# Patient Record
Sex: FEMALE | Race: WHITE | HISPANIC OR LATINO | Employment: OTHER | URBAN - METROPOLITAN AREA
[De-identification: names, ages, dates, MRNs, and addresses within clinical notes are randomized per-mention and may not be internally consistent; named-entity substitution may affect disease eponyms.]

---

## 2019-10-02 ENCOUNTER — HOSPITAL ENCOUNTER (EMERGENCY)
Facility: MEDICAL CENTER | Age: 84
End: 2019-10-02
Attending: EMERGENCY MEDICINE
Payer: MEDICARE

## 2019-10-02 ENCOUNTER — APPOINTMENT (OUTPATIENT)
Dept: RADIOLOGY | Facility: MEDICAL CENTER | Age: 84
End: 2019-10-02
Attending: EMERGENCY MEDICINE
Payer: MEDICARE

## 2019-10-02 VITALS
OXYGEN SATURATION: 97 % | DIASTOLIC BLOOD PRESSURE: 68 MMHG | RESPIRATION RATE: 16 BRPM | HEART RATE: 96 BPM | HEIGHT: 59 IN | WEIGHT: 109.13 LBS | TEMPERATURE: 97.9 F | SYSTOLIC BLOOD PRESSURE: 122 MMHG | BODY MASS INDEX: 22 KG/M2

## 2019-10-02 DIAGNOSIS — N30.00 ACUTE CYSTITIS WITHOUT HEMATURIA: ICD-10-CM

## 2019-10-02 DIAGNOSIS — R10.9 ACUTE ABDOMINAL PAIN: ICD-10-CM

## 2019-10-02 DIAGNOSIS — R19.7 DIARRHEA, UNSPECIFIED TYPE: ICD-10-CM

## 2019-10-02 LAB
ALBUMIN SERPL BCP-MCNC: 4.2 G/DL (ref 3.2–4.9)
ALBUMIN/GLOB SERPL: 1.3 G/DL
ALP SERPL-CCNC: 91 U/L (ref 30–99)
ALT SERPL-CCNC: 12 U/L (ref 2–50)
ANION GAP SERPL CALC-SCNC: 8 MMOL/L (ref 0–11.9)
APPEARANCE UR: CLEAR
AST SERPL-CCNC: 19 U/L (ref 12–45)
BACTERIA #/AREA URNS HPF: ABNORMAL /HPF
BASOPHILS # BLD AUTO: 0.3 % (ref 0–1.8)
BASOPHILS # BLD: 0.03 K/UL (ref 0–0.12)
BILIRUB SERPL-MCNC: 0.6 MG/DL (ref 0.1–1.5)
BILIRUB UR QL STRIP.AUTO: NEGATIVE
BUN SERPL-MCNC: 15 MG/DL (ref 8–22)
CALCIUM SERPL-MCNC: 9.6 MG/DL (ref 8.5–10.5)
CHLORIDE SERPL-SCNC: 104 MMOL/L (ref 96–112)
CO2 SERPL-SCNC: 30 MMOL/L (ref 20–33)
COLOR UR: YELLOW
CREAT SERPL-MCNC: 0.8 MG/DL (ref 0.5–1.4)
EOSINOPHIL # BLD AUTO: 0.1 K/UL (ref 0–0.51)
EOSINOPHIL NFR BLD: 1.2 % (ref 0–6.9)
EPI CELLS #/AREA URNS HPF: NEGATIVE /HPF
ERYTHROCYTE [DISTWIDTH] IN BLOOD BY AUTOMATED COUNT: 45.4 FL (ref 35.9–50)
GLOBULIN SER CALC-MCNC: 3.2 G/DL (ref 1.9–3.5)
GLUCOSE SERPL-MCNC: 116 MG/DL (ref 65–99)
GLUCOSE UR STRIP.AUTO-MCNC: NEGATIVE MG/DL
HCT VFR BLD AUTO: 39.4 % (ref 37–47)
HGB BLD-MCNC: 12.6 G/DL (ref 12–16)
HYALINE CASTS #/AREA URNS LPF: ABNORMAL /LPF
IMM GRANULOCYTES # BLD AUTO: 0.01 K/UL (ref 0–0.11)
IMM GRANULOCYTES NFR BLD AUTO: 0.1 % (ref 0–0.9)
KETONES UR STRIP.AUTO-MCNC: NEGATIVE MG/DL
LACTATE BLD-SCNC: 0.9 MMOL/L (ref 0.5–2)
LEUKOCYTE ESTERASE UR QL STRIP.AUTO: ABNORMAL
LIPASE SERPL-CCNC: 37 U/L (ref 11–82)
LYMPHOCYTES # BLD AUTO: 1.6 K/UL (ref 1–4.8)
LYMPHOCYTES NFR BLD: 18.5 % (ref 22–41)
MCH RBC QN AUTO: 30.1 PG (ref 27–33)
MCHC RBC AUTO-ENTMCNC: 32 G/DL (ref 33.6–35)
MCV RBC AUTO: 94 FL (ref 81.4–97.8)
MICRO URNS: ABNORMAL
MONOCYTES # BLD AUTO: 0.37 K/UL (ref 0–0.85)
MONOCYTES NFR BLD AUTO: 4.3 % (ref 0–13.4)
NEUTROPHILS # BLD AUTO: 6.56 K/UL (ref 2–7.15)
NEUTROPHILS NFR BLD: 75.6 % (ref 44–72)
NITRITE UR QL STRIP.AUTO: NEGATIVE
NRBC # BLD AUTO: 0 K/UL
NRBC BLD-RTO: 0 /100 WBC
PH UR STRIP.AUTO: 8.5 [PH] (ref 5–8)
PLATELET # BLD AUTO: 328 K/UL (ref 164–446)
PMV BLD AUTO: 9.9 FL (ref 9–12.9)
POTASSIUM SERPL-SCNC: 3.7 MMOL/L (ref 3.6–5.5)
PROT SERPL-MCNC: 7.4 G/DL (ref 6–8.2)
PROT UR QL STRIP: NEGATIVE MG/DL
RBC # BLD AUTO: 4.19 M/UL (ref 4.2–5.4)
RBC # URNS HPF: ABNORMAL /HPF
RBC UR QL AUTO: NEGATIVE
SODIUM SERPL-SCNC: 142 MMOL/L (ref 135–145)
SP GR UR STRIP.AUTO: 1.02
TROPONIN T SERPL-MCNC: 10 NG/L (ref 6–19)
UROBILINOGEN UR STRIP.AUTO-MCNC: 0.2 MG/DL
WBC # BLD AUTO: 8.7 K/UL (ref 4.8–10.8)
WBC #/AREA URNS HPF: ABNORMAL /HPF

## 2019-10-02 PROCEDURE — 87086 URINE CULTURE/COLONY COUNT: CPT

## 2019-10-02 PROCEDURE — 81001 URINALYSIS AUTO W/SCOPE: CPT

## 2019-10-02 PROCEDURE — 85025 COMPLETE CBC W/AUTO DIFF WBC: CPT

## 2019-10-02 PROCEDURE — 80053 COMPREHEN METABOLIC PANEL: CPT

## 2019-10-02 PROCEDURE — 93005 ELECTROCARDIOGRAM TRACING: CPT | Performed by: EMERGENCY MEDICINE

## 2019-10-02 PROCEDURE — 700117 HCHG RX CONTRAST REV CODE 255: Performed by: EMERGENCY MEDICINE

## 2019-10-02 PROCEDURE — 83605 ASSAY OF LACTIC ACID: CPT

## 2019-10-02 PROCEDURE — 700102 HCHG RX REV CODE 250 W/ 637 OVERRIDE(OP): Performed by: EMERGENCY MEDICINE

## 2019-10-02 PROCEDURE — A9270 NON-COVERED ITEM OR SERVICE: HCPCS | Performed by: EMERGENCY MEDICINE

## 2019-10-02 PROCEDURE — 36415 COLL VENOUS BLD VENIPUNCTURE: CPT

## 2019-10-02 PROCEDURE — 83690 ASSAY OF LIPASE: CPT

## 2019-10-02 PROCEDURE — 74177 CT ABD & PELVIS W/CONTRAST: CPT

## 2019-10-02 PROCEDURE — 99284 EMERGENCY DEPT VISIT MOD MDM: CPT

## 2019-10-02 PROCEDURE — 84484 ASSAY OF TROPONIN QUANT: CPT

## 2019-10-02 RX ORDER — CEPHALEXIN 500 MG/1
500 CAPSULE ORAL 3 TIMES DAILY
Qty: 15 CAP | Refills: 0 | Status: SHIPPED | OUTPATIENT
Start: 2019-10-02 | End: 2019-10-07

## 2019-10-02 RX ORDER — CEPHALEXIN 500 MG/1
500 CAPSULE ORAL ONCE
Status: COMPLETED | OUTPATIENT
Start: 2019-10-02 | End: 2019-10-02

## 2019-10-02 RX ADMIN — IOHEXOL 80 ML: 350 INJECTION, SOLUTION INTRAVENOUS at 22:30

## 2019-10-02 RX ADMIN — CEPHALEXIN 500 MG: 500 CAPSULE ORAL at 23:11

## 2019-10-02 SDOH — HEALTH STABILITY: MENTAL HEALTH: HOW OFTEN DO YOU HAVE A DRINK CONTAINING ALCOHOL?: NEVER

## 2019-10-02 NOTE — ED TRIAGE NOTES
Chief Complaint   Patient presents with   • Abdominal Pain     x3 days     Ambulatory to triage for above. Explained triage process, to waiting room. Asked to inform RN if questions or concerns arise.

## 2019-10-03 NOTE — ED NOTES
RN attempt at IV access. Pt refused to have assigned RN perform procedure per Pt RN is too young and inexperienced and pt is requesting a more skillful RN. Second RN consulted for help with procedure.

## 2019-10-03 NOTE — ED NOTES
Pt discharged with one paper prescription to  at pharmacy. Pt verbalized understanding of discharge education and medication information. Pt ambulated to the lobby with steady gait accompanied by family member.

## 2019-10-03 NOTE — ED PROVIDER NOTES
"ED Provider Note    Scribed for Matt Alvarenga M.D. by Raquel Johnson. 10/2/2019  8:31 PM    Primary care provider: Patient states none  Means of arrival: Walk in  History obtained from: Patient   History limited by: None     CHIEF COMPLAINT  Chief Complaint   Patient presents with   • Abdominal Pain     x3 days       HPI  Digna Villar is a 84 y.o. female who presents to the Emergency Department for evaluation of worsening abdominal pain onset 3 days ago. Patient states she also has diarrhea. Patient suspects a bloody bowel movement but is unsure. Denies fever, chest pain, cough, vomiting or dysuria. She denies any accident or recent travel. Patient has had gallbladder removal surgery. Patient lives in [place] and has been visiting in [place] since last month.    REVIEW OF SYSTEMS  Pertinent positives include: abdominal pain, and diarrhea. Pertinent negatives include: fever, chest pain, cough, vomiting or dysuria. See history of present illness. All other systems are negative.     PAST MEDICAL HISTORY   has a past medical history of CAD (coronary artery disease) and Hypothyroid.    SURGICAL HISTORY  Gallbladder removal     SOCIAL HISTORY  Social History     Tobacco Use   • Smoking status: Never Smoker   Substance Use Topics   • Alcohol use: Never     Frequency: Never   • Drug use: Never      Social History     Substance and Sexual Activity   Drug Use Never       FAMILY HISTORY  History reviewed. No pertinent family history.    CURRENT MEDICATIONS  Home Medications     Reviewed by Nella Kirk R.N. (Registered Nurse) on 10/02/19 at 2027  Med List Status: Partial   Medication Last Dose Status   LEVOTHYROXINE SODIUM PO  Active                ALLERGIES  No Known Allergies    PHYSICAL EXAM  VITAL SIGNS: /79   Pulse 99   Temp 36.6 °C (97.9 °F) (Temporal)   Resp 16   Ht 1.499 m (4' 11\")   Wt 49.5 kg (109 lb 2 oz)   SpO2 95%   BMI 22.04 kg/m²     Constitutional: Alert in no apparent distress.  HENT: No " signs of trauma, Bilateral external ears normal, Nose normal. Uvula midline.   Eyes: Pupils are equal and reactive, Conjunctiva normal, Non-icteric.   Neck: Normal range of motion, No tenderness, Supple, No stridor.   Lymphatic: No lymphadenopathy noted.   Cardiovascular: Regular rate and rhythm, no murmurs.   Thorax & Lungs: Normal breath sounds, No respiratory distress, No wheezing, No chest tenderness.   Abdomen:  Left lower quadrant is soft and tender to palpation. Soft, No tenderness, No peritoneal signs, No masses, No pulsatile masses.   Skin: Warm, Dry, No erythema, No rash.   Back: No bony tenderness, No CVA tenderness.   Extremities: Intact distal pulses, No edema, No tenderness, No cyanosis.  Musculoskeletal: Good range of motion in all major joints. No tenderness to palpation or major deformities noted.   Neurologic: Alert , Normal motor function, Normal sensory function, No focal deficits noted.   Psychiatric: Affect normal, Judgment normal, Mood normal.     DIAGNOSTIC STUDIES / PROCEDURES    LABS  Labs Reviewed   CBC WITH DIFFERENTIAL - Abnormal; Notable for the following components:       Result Value    RBC 4.19 (*)     MCHC 32.0 (*)     Neutrophils-Polys 75.60 (*)     Lymphocytes 18.50 (*)     All other components within normal limits   COMP METABOLIC PANEL - Abnormal; Notable for the following components:    Glucose 116 (*)     All other components within normal limits   URINALYSIS,CULTURE IF INDICATED - Abnormal; Notable for the following components:    Ph 8.5 (*)     Leukocyte Esterase Moderate (*)     All other components within normal limits   URINE MICROSCOPIC (W/UA) - Abnormal; Notable for the following components:    WBC  (*)     Bacteria Few (*)     All other components within normal limits   LIPASE   ESTIMATED GFR   TROPONIN   LACTIC ACID   URINE CULTURE(NEW)      All labs reviewed by me.    EKG  12 Lead EKG interpreted by me to show:  Indication: Normal EKG  Normal sinus rhythm  Rate  81  Axis: Normal  Intervals: Normal  Normal T waves  Normal ST segments  My impression of this EKG: No STEMI.     RADIOLOGY  CT-ABDOMEN-PELVIS WITH   Final Result         1.  No acute abnormality.   2.  Diverticulosis   3.  Small fat-containing right inguinal hernia   4.  Atherosclerosis and atherosclerotic coronary artery disease.   5.  5 mm right middle lobe pulmonary nodule, see nodule follow-up recommendations below.      Low Risk: No routine follow-up      High Risk: Optional CT at 12 months      Comments: Nodules less than 6 mm do not require routine follow-up, but certain patients at high risk with suspicious nodule morphology, upper lobe location, or both may warrant 12-month follow-up.      Low Risk - Minimal or absent history of smoking and of other known risk factors.      High Risk - History of smoking or of other known risk factors.      Note: These recommendations do not apply to lung cancer screening, patients with immunosuppression, or patients with known primary cancer.      Fleischner Society 2017 Guidelines for Management of Incidentally Detected Pulmonary Nodules in Adults           The radiologist's interpretation of all radiological studies have been reviewed by me.    COURSE & MEDICAL DECISION MAKING  Nursing notes, VS, PMSFHx reviewed in chart.    84 y.o. female p/w chief complaint of worsening abdominal pain onset 1 week ago.    8:31 PM Patient seen and examined at bedside.  Patient will be treated with Omnipaque 350 mg/mL. Ordered CT-Abdomen-Pelvis, troponin, lactic acid, urinalysis, estimated GFR, CBC with diff.,CMP, Lipase, Urine Microscopic, and EKG. I discussed with patient the labs and orders needed to rule out diverticulitis. I also recommended a CT for chest to rule out any abnormalties.  Patient and patient's family was given the opportunity to ask questions at this time.     #abdominal pain     CT scan of abdomen ordered in order to rule out mass vs diverticulitis vs abscess  No  RLQ pain, TTP or fever to suggest appendicitis  No pain at of proportion to suggest mesenteric ischemia  No e/o rash or zoster  + UTI, will tx w/ keflex  No upper abd pain or elevation in lipase to suggest pancreatitis  ECG unremarkable and no chest pain to suggest intrathoracic etiology of abd pain    10:54 PM - Re-examined; The patient is resting in bed. I discussed her above findings and plans for discharge with a prescription for Keflex. She was instructed to return to the ED if her symptoms worsen. Patient understands and agrees.    The patient will return for new or worsening symptoms and is stable at the time of discharge.    The patient is referred to a primary physician for blood pressure management, diabetic screening, and for all other preventative health concerns.    DISPOSITION:  Patient will be discharged home in stable condition.    FOLLOW UP:  [facility], Emergency Dept  [address] [facility] [address] [phone]    If symptoms worsen    [address] [id number] [phone]    Please call to schedule close follow up appointment and repeat urine testing      OUTPATIENT MEDICATIONS:  Discharge Medication List as of 10/2/2019 10:55 PM      START taking these medications    Details   cephALEXin (KEFLEX) 500 MG Cap Take 1 Cap by mouth 3 times a day for 5 days., Disp-15 Cap, R-0, Print Rx Paper               FINAL IMPRESSION  1. Diarrhea, unspecified type    2. Acute abdominal pain    3. Acute cystitis without hematuria          Raquel IGL (Alfonso), am scribing for, and in the presence of, Matt Alvarenga M.D..    Electronically signed by: Raquel Johnson (Alfonso), 10/2/2019    IMatt M.D. personally performed the services described in this documentation, as scribed by Raquel Johnson in my presence, and it is both accurate and complete.    C    The note accurately reflects work and decisions made by me.  Matt  KAREN Alvarenga  10/3/2019  4:46 AM

## 2019-10-03 NOTE — ED NOTES
Pt presents to the ED with complaints of abdominal pain. Pt states that she has been having diarrhea for several days. Pt states that she has hx of IBS but denies any other significant medical hx.

## 2019-10-05 LAB
BACTERIA UR CULT: NORMAL
SIGNIFICANT IND 70042: NORMAL
SITE SITE: NORMAL
SOURCE SOURCE: NORMAL

## 2019-10-13 LAB — EKG IMPRESSION: NORMAL

## 2021-01-03 ENCOUNTER — HOSPITAL ENCOUNTER (OUTPATIENT)
Facility: MEDICAL CENTER | Age: 86
End: 2021-01-03
Attending: FAMILY MEDICINE
Payer: MEDICARE

## 2021-01-03 ENCOUNTER — OFFICE VISIT (OUTPATIENT)
Dept: URGENT CARE | Facility: CLINIC | Age: 86
End: 2021-01-03
Payer: MEDICARE

## 2021-01-03 ENCOUNTER — APPOINTMENT (OUTPATIENT)
Dept: RADIOLOGY | Facility: IMAGING CENTER | Age: 86
End: 2021-01-03
Attending: FAMILY MEDICINE
Payer: MEDICARE

## 2021-01-03 VITALS
DIASTOLIC BLOOD PRESSURE: 80 MMHG | OXYGEN SATURATION: 97 % | HEART RATE: 76 BPM | BODY MASS INDEX: 23.99 KG/M2 | TEMPERATURE: 97 F | SYSTOLIC BLOOD PRESSURE: 120 MMHG | WEIGHT: 111.2 LBS | RESPIRATION RATE: 14 BRPM | HEIGHT: 57 IN

## 2021-01-03 DIAGNOSIS — R11.0 NAUSEA: ICD-10-CM

## 2021-01-03 DIAGNOSIS — R06.02 SHORTNESS OF BREATH: ICD-10-CM

## 2021-01-03 DIAGNOSIS — M54.6 ACUTE RIGHT-SIDED THORACIC BACK PAIN: ICD-10-CM

## 2021-01-03 LAB
COVID ORDER STATUS COVID19: NORMAL
SARS-COV-2 RNA RESP QL NAA+PROBE: NOTDETECTED
SPECIMEN SOURCE: NORMAL

## 2021-01-03 PROCEDURE — U0005 INFEC AGEN DETEC AMPLI PROBE: HCPCS

## 2021-01-03 PROCEDURE — 99204 OFFICE O/P NEW MOD 45 MIN: CPT | Performed by: FAMILY MEDICINE

## 2021-01-03 PROCEDURE — U0003 INFECTIOUS AGENT DETECTION BY NUCLEIC ACID (DNA OR RNA); SEVERE ACUTE RESPIRATORY SYNDROME CORONAVIRUS 2 (SARS-COV-2) (CORONAVIRUS DISEASE [COVID-19]), AMPLIFIED PROBE TECHNIQUE, MAKING USE OF HIGH THROUGHPUT TECHNOLOGIES AS DESCRIBED BY CMS-2020-01-R: HCPCS

## 2021-01-03 PROCEDURE — 71046 X-RAY EXAM CHEST 2 VIEWS: CPT | Mod: TC | Performed by: FAMILY MEDICINE

## 2021-01-03 RX ORDER — ICOSAPENT ETHYL 1000 MG/1
2 CAPSULE ORAL
COMMUNITY
Start: 2020-10-23

## 2021-01-03 RX ORDER — ONDANSETRON 4 MG/1
4 TABLET, ORALLY DISINTEGRATING ORAL ONCE
Status: COMPLETED | OUTPATIENT
Start: 2021-01-03 | End: 2021-01-03

## 2021-01-03 RX ORDER — ONDANSETRON 4 MG/1
4 TABLET, ORALLY DISINTEGRATING ORAL EVERY 8 HOURS PRN
Qty: 6 TAB | Refills: 0 | Status: SHIPPED | OUTPATIENT
Start: 2021-01-03

## 2021-01-03 RX ORDER — RIVASTIGMINE 9.5 MG/24H
1 PATCH, EXTENDED RELEASE TRANSDERMAL DAILY
COMMUNITY
Start: 2020-12-31 | End: 2021-10-07

## 2021-01-03 RX ORDER — ASPIRIN 81 MG/1
81 TABLET ORAL DAILY
COMMUNITY

## 2021-01-03 RX ADMIN — ONDANSETRON 4 MG: 4 TABLET, ORALLY DISINTEGRATING ORAL at 15:01

## 2021-01-03 ASSESSMENT — ENCOUNTER SYMPTOMS
NAUSEA: 0
EYE REDNESS: 0
WEIGHT LOSS: 0
VOMITING: 0
EYE DISCHARGE: 0
SORE THROAT: 0
SENSORY CHANGE: 0
NERVOUS/ANXIOUS: 0
PALPITATIONS: 0
MYALGIAS: 0
SPUTUM PRODUCTION: 1
HEMOPTYSIS: 0
FLANK PAIN: 0
FOCAL WEAKNESS: 0

## 2021-01-03 ASSESSMENT — LIFESTYLE VARIABLES: SUBSTANCE_ABUSE: 0

## 2021-01-03 ASSESSMENT — FIBROSIS 4 INDEX: FIB4 SCORE: 1.44

## 2021-01-03 NOTE — PROGRESS NOTES
"Subjective:      Digna Villar is a 86 y.o. female who presents with Emesis (x 2 days with dizziness.  Pain behind R lung x 2 months. )            2 days N/V, 2 episodes. No blood in emesis. Possible food trigger from [employer] restaurant. Leg cramping. No fever. No diarrhea. Trying to push fluids. Urinated this morning. 2 months right upper back pain/intermittent with associated SOB. \"radiates to my lung\". Intermittent cough/worse in morning. No limb swelling. No other aggravating or alleviating factors.        Review of Systems   Constitutional: Negative for malaise/fatigue and weight loss.   HENT: Negative for ear pain and sore throat.    Eyes: Negative for discharge and redness.   Respiratory: Positive for sputum production (in the morning). Negative for hemoptysis.    Cardiovascular: Negative for palpitations and leg swelling.   Gastrointestinal: Negative for nausea and vomiting.   Genitourinary: Negative for dysuria, flank pain and hematuria.   Musculoskeletal: Negative for joint pain and myalgias.   Skin: Negative for itching and rash.   Neurological: Negative for sensory change and focal weakness.   Psychiatric/Behavioral: Negative for substance abuse. The patient is not nervous/anxious.      .  Medications, Allergies, and current problem list reviewed today in Epic  PMH:  has a past medical history of CAD (coronary artery disease) and Hypothyroid.   Also noted GI/possible IBD  MEDS:   Current Outpatient Medications:   •  rivastigmine (EXELON) 9.5 MG/24HR patch, Place 1 Patch on the skin every day., Disp: , Rfl:   •  Icosapent Ethyl 1 g Cap, Take 2 Caps by mouth., Disp: , Rfl:   •  ondansetron (ZOFRAN ODT) 4 MG TABLET DISPERSIBLE, Take 1 Tab by mouth every 8 hours as needed., Disp: 6 Tab, Rfl: 0  •  aspirin 81 MG EC tablet, Take 81 mg by mouth every day., Disp: , Rfl:   •  LEVOTHYROXINE SODIUM PO, Take  by mouth., Disp: , Rfl:     Current Facility-Administered Medications:   •  ondansetron (ZOFRAN ODT) " "dispertab 4 mg, 4 mg, Oral, Once, Vinay Adkins M.D.  ALLERGIES:   Allergies   Allergen Reactions   • Pcn [Penicillins] Rash     RAsh     SURGHX: History reviewed. No pertinent surgical history.  SOCHX:  reports that she has never smoked. She has never used smokeless tobacco. She reports that she does not drink alcohol or use drugs.  FH: Reviewed with patient, not pertinent to this visit.        Objective:     /80   Pulse 76   Temp 36.1 °C (97 °F) (Temporal)   Resp 14   Ht 1.448 m (4' 9\")   Wt 50.4 kg (111 lb 3.2 oz)   SpO2 97%   BMI 24.06 kg/m²      Physical Exam  Constitutional:       General: She is not in acute distress.     Appearance: She is well-developed.   HENT:      Head: Normocephalic and atraumatic.      Nose: Nose normal. No congestion.      Mouth/Throat:      Mouth: Mucous membranes are moist.   Eyes:      Conjunctiva/sclera: Conjunctivae normal.   Neck:      Musculoskeletal: Normal range of motion and neck supple.   Cardiovascular:      Rate and Rhythm: Normal rate and regular rhythm.      Heart sounds: Normal heart sounds. No murmur.   Pulmonary:      Effort: Pulmonary effort is normal.      Breath sounds: Normal breath sounds. No wheezing.   Abdominal:      General: Bowel sounds are normal.      Palpations: Abdomen is soft.      Tenderness: There is no abdominal tenderness.   Musculoskeletal:        Back:       Right lower leg: No edema.      Left lower leg: No edema.   Skin:     General: Skin is warm and dry.      Findings: No rash.   Neurological:      Mental Status: She is alert and oriented to person, place, and time.   Psychiatric:         Mood and Affect: Mood normal.                 Assessment/Plan:       CXR: no acute cardiopulmonary process per radiology    1. Nausea  ondansetron (ZOFRAN ODT) dispertab 4 mg    ondansetron (ZOFRAN ODT) 4 MG TABLET DISPERSIBLE    COVID/SARS COV-2 PCR    REFERRAL TO FOLLOW-UP WITH PRIMARY CARE   2. Acute right-sided thoracic back pain  REFERRAL TO " FOLLOW-UP WITH PRIMARY CARE   3. Shortness of breath  DX-CHEST-2 VIEWS    COVID/SARS COV-2 PCR    REFERRAL TO FOLLOW-UP WITH PRIMARY CARE       Differential diagnosis, natural history, supportive care, and indications for immediate follow-up discussed at length.     ORT with pedialyte    Self isolate per CDC protocol.  Follow-up COVID-19 testing.

## 2021-04-28 NOTE — DISCHARGE INSTRUCTIONS
Please discuss the following findings with your regular doctor:  CT-ABDOMEN-PELVIS WITH   Final Result         1.  No acute abnormality.   2.  Diverticulosis   3.  Small fat-containing right inguinal hernia   4.  Atherosclerosis and atherosclerotic coronary artery disease.   5.  5 mm right middle lobe pulmonary nodule, see nodule follow-up recommendations below.      Low Risk: No routine follow-up      High Risk: Optional CT at 12 months      Comments: Nodules less than 6 mm do not require routine follow-up, but certain patients at high risk with suspicious nodule morphology, upper lobe location, or both may warrant 12-month follow-up.      Low Risk - Minimal or absent history of smoking and of other known risk factors.      High Risk - History of smoking or of other known risk factors.      Note: These recommendations do not apply to lung cancer screening, patients with immunosuppression, or patients with known primary cancer.      Fleischner Society 2017 Guidelines for Management of Incidentally Detected Pulmonary Nodules in Adults            
Colonoscopy

## 2021-06-04 ENCOUNTER — OFFICE VISIT (OUTPATIENT)
Dept: URGENT CARE | Facility: CLINIC | Age: 86
End: 2021-06-04
Payer: MEDICARE

## 2021-06-04 ENCOUNTER — APPOINTMENT (OUTPATIENT)
Dept: RADIOLOGY | Facility: IMAGING CENTER | Age: 86
End: 2021-06-04
Attending: PHYSICIAN ASSISTANT
Payer: MEDICARE

## 2021-06-04 VITALS
HEART RATE: 80 BPM | SYSTOLIC BLOOD PRESSURE: 120 MMHG | DIASTOLIC BLOOD PRESSURE: 60 MMHG | OXYGEN SATURATION: 95 % | HEIGHT: 57 IN | BODY MASS INDEX: 25.03 KG/M2 | RESPIRATION RATE: 16 BRPM | TEMPERATURE: 97 F | WEIGHT: 116 LBS

## 2021-06-04 DIAGNOSIS — S93.401A SPRAIN OF RIGHT ANKLE, UNSPECIFIED LIGAMENT, INITIAL ENCOUNTER: ICD-10-CM

## 2021-06-04 PROCEDURE — 73610 X-RAY EXAM OF ANKLE: CPT | Mod: TC,RT | Performed by: PHYSICIAN ASSISTANT

## 2021-06-04 PROCEDURE — 99213 OFFICE O/P EST LOW 20 MIN: CPT | Performed by: PHYSICIAN ASSISTANT

## 2021-06-04 PROCEDURE — 73562 X-RAY EXAM OF KNEE 3: CPT | Mod: TC,RT | Performed by: PHYSICIAN ASSISTANT

## 2021-06-04 ASSESSMENT — ENCOUNTER SYMPTOMS
COUGH: 0
ABDOMINAL PAIN: 0
NAUSEA: 0
SPEECH CHANGE: 0
CHILLS: 0
DIARRHEA: 0
CLAUDICATION: 0
TINGLING: 0
WEAKNESS: 0
FEVER: 0
SHORTNESS OF BREATH: 0
TREMORS: 0
SENSORY CHANGE: 0
PALPITATIONS: 0
ORTHOPNEA: 0
DIZZINESS: 0
LOSS OF CONSCIOUSNESS: 0
SEIZURES: 0
HEADACHES: 0
VOMITING: 0
FOCAL WEAKNESS: 0
DOUBLE VISION: 0
BLURRED VISION: 0

## 2021-06-04 ASSESSMENT — FIBROSIS 4 INDEX: FIB4 SCORE: 1.44

## 2021-06-04 NOTE — PROGRESS NOTES
"Subjective:   Digna Villar is a 86 y.o. female who presents for Ankle Injury ((R) ankle may be broken, x1 day )      Ankle Injury   The incident occurred 12 to 24 hours ago. The incident occurred at home. The injury mechanism was an inversion injury. Pain location: right ankle. The pain is moderate. The pain has been constant since onset. Pertinent negatives include no tingling. She reports no foreign bodies present. Nothing aggravates the symptoms.       Review of Systems   Constitutional: Negative for chills and fever.   Eyes: Negative for blurred vision and double vision.   Respiratory: Negative for cough and shortness of breath.    Cardiovascular: Negative for chest pain, palpitations, orthopnea, claudication and leg swelling.   Gastrointestinal: Negative for abdominal pain, diarrhea, nausea and vomiting.   Musculoskeletal: Positive for joint pain.   Skin: Negative for rash.   Neurological: Negative for dizziness, tingling, tremors, sensory change, speech change, focal weakness, seizures, loss of consciousness, weakness and headaches.   All other systems reviewed and are negative.      Medications:    • aspirin  • Icosapent Ethyl Caps  • LEVOTHYROXINE SODIUM PO  • ondansetron Tbdp  • rivastigmine    Allergies: Pcn [penicillins]    Problem List: Digna Villar does not have a problem list on file.    Surgical History:  No past surgical history on file.    Past Social Hx: Digna Villar  reports that she has never smoked. She has never used smokeless tobacco. She reports that she does not drink alcohol and does not use drugs.     Past Family Hx:  Digna Villar family history is not on file.     Problem list, medications, and allergies reviewed by myself today in Epic.     Objective:     Blood Pressure 120/60 (BP Location: Left arm, Patient Position: Sitting, BP Cuff Size: Adult long)   Pulse 80   Temperature 36.1 °C (97 °F) (Temporal)   Respiration 16   Height 1.448 m (4' 9\")   Weight 52.6 kg (116 lb)  "  Oxygen Saturation 95%   Body Mass Index 25.10 kg/m²     Physical Exam  Vitals reviewed.   Constitutional:       Appearance: She is well-developed.   Cardiovascular:      Rate and Rhythm: Normal rate and regular rhythm.      Pulses: Normal pulses.           Dorsalis pedis pulses are 2+ on the right side and 2+ on the left side.        Posterior tibial pulses are 2+ on the right side and 2+ on the left side.      Heart sounds: Normal heart sounds.   Pulmonary:      Effort: Pulmonary effort is normal.      Breath sounds: Normal breath sounds.   Musculoskeletal:         General: Tenderness present.      Cervical back: Normal range of motion and neck supple.      Right lower leg: No edema.      Left lower leg: No edema.      Comments: PTP of the medial malleolus, right.  Pain at the knee as well.   Soft tissue swelling present with limited ROM compared to unaffected side.  No bony tenderness of the navicular, 5th metatarsal bones.      Special Tests:    Anterior Drawer: NEG  Bruce Test: NEG  Interdigital Neuroma Test: N/A   Skin:     General: Skin is warm and dry.      Capillary Refill: Capillary refill takes less than 2 seconds.      Findings: No erythema.      Comments: Skin intact over the affected area.   Neurological:      General: No focal deficit present.      Mental Status: She is alert and oriented to person, place, and time. Mental status is at baseline.      Coordination: Coordination normal.      Comments: Antalgic gait.   Psychiatric:         Mood and Affect: Mood normal.         Behavior: Behavior normal.         Thought Content: Thought content normal.         Judgment: Judgment normal.         RADIOLOGY RESULTS   DX-ANKLE 3+ VIEWS RIGHT    Result Date: 6/4/2021 6/4/2021 2:12 PM HISTORY/REASON FOR EXAM:  Pain/Deformity Following Trauma TECHNIQUE/EXAM DESCRIPTION AND NUMBER OF VIEWS:  3 views of the RIGHT ankle. COMPARISON: None FINDINGS: There is no evidence of fracture or dislocation.  The ankle  mortise is well-maintained. The talar dome is preserved.  No substantial soft tissue swelling is noted. There is spurring of the calcaneus at the insertion of the Achilles tendon.     1.  No evidence of fracture or dislocation. 2.  Calcaneal spurring.    DX-KNEE 3 VIEWS RIGHT    Result Date: 6/4/2021 6/4/2021 2:18 PM HISTORY/REASON FOR EXAM:  Pain/Deformity Following Trauma TECHNIQUE/EXAM DESCRIPTION AND NUMBER OF VIEWS:  3 views of the RIGHT knee. COMPARISON: None FINDINGS: There is no evidence of fracture or dislocation. There is mild tibiofemoral joint space narrowing, particularly medially. No joint effusion is seen. Bones are demineralized. Atherosclerotic calcification is seen.     1.  No evidence of acute fracture or dislocation. 2.  Demineralization. 3.  Mild Degenerative changes. 4.  Atherosclerotic plaque.            Assessment/Plan:     Medical Decision Making/Comments     -fall with inversion injury.  Pain of right ankle and knee.  -x rays normal.  No red flags on exam   Diagnosis and associated orders     1. Sprain of right ankle, unspecified ligament, initial encounter  DX-ANKLE 3+ VIEWS RIGHT    DX-KNEE 3 VIEWS RIGHT     -Protection/compression  -Rest: activity as tolerated  -Ice: Ice first 3-7 days.  20 min off/on  -Elevation  -NSAIDs/Acetomenophen as needed             Differential diagnosis, natural history, supportive care, and indications for immediate follow-up discussed.    Advised the patient to follow-up with the primary care physician for recheck, reevaluation, and consideration of further management.    Please note that this dictation was created using voice recognition software. I have made a reasonable attempt to correct obvious errors, but I expect that there are errors of grammar and possibly content that I did not discover before finalizing the note.

## 2021-07-08 ENCOUNTER — APPOINTMENT (OUTPATIENT)
Dept: RADIOLOGY | Facility: IMAGING CENTER | Age: 86
End: 2021-07-08
Attending: FAMILY MEDICINE
Payer: MEDICARE

## 2021-07-08 ENCOUNTER — OFFICE VISIT (OUTPATIENT)
Dept: URGENT CARE | Facility: CLINIC | Age: 86
End: 2021-07-08
Payer: MEDICARE

## 2021-07-08 VITALS
TEMPERATURE: 97 F | BODY MASS INDEX: 24.94 KG/M2 | WEIGHT: 118.8 LBS | HEART RATE: 68 BPM | OXYGEN SATURATION: 96 % | HEIGHT: 58 IN | DIASTOLIC BLOOD PRESSURE: 90 MMHG | SYSTOLIC BLOOD PRESSURE: 122 MMHG
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
## 2021-07-08 DIAGNOSIS — S46.912A STRAIN OF LEFT SHOULDER, INITIAL ENCOUNTER: ICD-10-CM

## 2021-07-08 PROCEDURE — 73060 X-RAY EXAM OF HUMERUS: CPT | Mod: TC,LT | Performed by: FAMILY MEDICINE

## 2021-07-08 PROCEDURE — 99214 OFFICE O/P EST MOD 30 MIN: CPT | Performed by: FAMILY MEDICINE

## 2021-07-08 ASSESSMENT — ENCOUNTER SYMPTOMS: FALLS: 1

## 2021-07-08 ASSESSMENT — FIBROSIS 4 INDEX: FIB4 SCORE: 1.44

## 2021-07-08 NOTE — PROGRESS NOTES
"Subjective:      Digna Villar is a 86 y.o. female who presents with Fall (left shoulder, [id number] ago hard to lift her arm)      - This is a pleasant and nontoxic appearing 86 y.o. female with c/o having Lt shoulder/arm pain s/p fall about 3 weeks ago. Initially when came in was having more lower limb pain, those pains have resolved but now still having Lt shoulder pain.        ALLERGIES:  Pcn [penicillins]     PMH:  Past Medical History:   Diagnosis Date   • CAD (coronary artery disease)    • Hypothyroid         PSH:  History reviewed. No pertinent surgical history.    MEDS:    Current Outpatient Medications:   •  aspirin 81 MG EC tablet, Take 81 mg by mouth every day., Disp: , Rfl:   •  rivastigmine (EXELON) 9.5 MG/24HR patch, Place 1 Patch on the skin every day., Disp: , Rfl:   •  Icosapent Ethyl 1 g Cap, Take 2 Caps by mouth., Disp: , Rfl:   •  ondansetron (ZOFRAN ODT) 4 MG TABLET DISPERSIBLE, Take 1 Tab by mouth every 8 hours as needed., Disp: 6 Tab, Rfl: 0  •  LEVOTHYROXINE SODIUM PO, Take  by mouth., Disp: , Rfl:     ** I have documented what I find to be significant in regards to past medical, social, family and surgical history  in my HPI or under PMH/PSH/FH review section, otherwise it is noncontributory **     HPI    Review of Systems   Musculoskeletal: Positive for falls and joint pain.   All other systems reviewed and are negative.     Objective:     /90 (BP Location: Right arm, Patient Position: Sitting, BP Cuff Size: Small adult)   Pulse 68   Temp 36.1 °C (97 °F) (Temporal)   Ht 1.473 m (4' 10\")   Wt 53.9 kg (118 lb 12.8 oz)   SpO2 96%   BMI 24.83 kg/m²      Physical Exam  Vitals and nursing note reviewed.   Constitutional:       General: She is not in acute distress.     Appearance: Normal appearance. She is well-developed.   HENT:      Head: Normocephalic and atraumatic.   Eyes:      General: No scleral icterus.  Cardiovascular:      Heart sounds: Normal heart sounds. No murmur heard. "     Pulmonary:      Effort: Pulmonary effort is normal. No respiratory distress.   Musculoskeletal:        Arms:       Comments: Lt arm: no discoloration. Felx/abd 100deg. + TTP. NVI   Skin:     Coloration: Skin is not jaundiced or pale.   Neurological:      Mental Status: She is alert.      Motor: No abnormal muscle tone.   Psychiatric:         Mood and Affect: Mood normal.         Behavior: Behavior normal.       Assessment/Plan:          1. Strain of left shoulder, initial encounter  DX-HUMERUS 2+ LEFT    REFERRAL TO SPORTS MEDICINE   2. Fall from ground level  DX-HUMERUS 2+ LEFT    REFERRAL TO SPORTS MEDICINE     Xray: no acute findings by MY READ. RADIOLOGY READ PENDING.     - Dx, plan & d/c instructions discussed w/ patient and/or family members  - Rest, stay hydrated OTC Motrin and/or Tylenol as needed  - E.R. precautions discussed     Asked to kindly follow up with their PCP's office in 2-3 days for a recheck, ER if not improving or feeling/getting worse.    Any realistic side effects of medications that may have been given today reviewed.     Patient left in stable condition      reviewed if narcotics given     Please note that this dictation was created using voice recognition software. I have made every reasonable attempt to correct obvious errors, but I expect that there are errors of grammar and possibly content that I did not discover before finalizing the note.

## 2021-08-17 ENCOUNTER — TELEPHONE (OUTPATIENT)
Dept: SCHEDULING | Facility: IMAGING CENTER | Age: 86
End: 2021-08-17

## 2021-08-19 ENCOUNTER — OFFICE VISIT (OUTPATIENT)
Dept: MEDICAL GROUP | Facility: CLINIC | Age: 86
End: 2021-08-19
Payer: MEDICARE

## 2021-08-19 ENCOUNTER — APPOINTMENT (OUTPATIENT)
Dept: RADIOLOGY | Facility: IMAGING CENTER | Age: 86
End: 2021-08-19
Attending: FAMILY MEDICINE
Payer: MEDICARE

## 2021-08-19 VITALS
TEMPERATURE: 97.7 F | DIASTOLIC BLOOD PRESSURE: 78 MMHG | HEART RATE: 87 BPM | WEIGHT: 118.8 LBS | OXYGEN SATURATION: 96 % | HEIGHT: 58 IN | SYSTOLIC BLOOD PRESSURE: 122 MMHG | BODY MASS INDEX: 24.94 KG/M2 | RESPIRATION RATE: 18 BRPM

## 2021-08-19 DIAGNOSIS — M54.2 CERVICAL SPINE PAIN: ICD-10-CM

## 2021-08-19 DIAGNOSIS — M79.602 LEFT ARM PAIN: ICD-10-CM

## 2021-08-19 DIAGNOSIS — M25.512 ACUTE PAIN OF LEFT SHOULDER: ICD-10-CM

## 2021-08-19 DIAGNOSIS — M79.602 ARM PAIN, LEFT: ICD-10-CM

## 2021-08-19 DIAGNOSIS — E03.4 HYPOTHYROIDISM DUE TO ACQUIRED ATROPHY OF THYROID: ICD-10-CM

## 2021-08-19 DIAGNOSIS — M47.812 SPONDYLOSIS OF CERVICAL REGION WITHOUT MYELOPATHY OR RADICULOPATHY: ICD-10-CM

## 2021-08-19 PROCEDURE — 72040 X-RAY EXAM NECK SPINE 2-3 VW: CPT | Mod: TC | Performed by: FAMILY MEDICINE

## 2021-08-19 PROCEDURE — 99215 OFFICE O/P EST HI 40 MIN: CPT | Performed by: FAMILY MEDICINE

## 2021-08-19 PROCEDURE — 73060 X-RAY EXAM OF HUMERUS: CPT | Mod: TC,LT | Performed by: FAMILY MEDICINE

## 2021-08-19 ASSESSMENT — FIBROSIS 4 INDEX: FIB4 SCORE: 1.44

## 2021-08-19 NOTE — PROGRESS NOTES
CHIEF COMPLAINT:  Digna Villar female presenting at the request of Michael Hutchins M.D.  for evaluation of Shoulder pain.     Digna Villar is complaining of left shoulder pain  Initial fall back in early June 2021 prior to her urgent care visit  Since urgent care visit she sustained a NEW fall in mid July 2021  Pain is at the deltoid region  Quality is aching  Pain is Non-radiating  Aggravated by Lifting the arm and lying on the LEFT side  Improved with  rest as well as use of shoulder sling, but she feels uncomfortable wearing it and only wears it intermittently  no prior problems with this shoulder in the past  Prior Treatments: seen at   Prior studies: X-Ray, but she sustained a NEW fall since her most recent urgent care x-ray  Medications tried for pain include: naproxen (OTC), which helps minimally  Mechanical Symptom history: Popping  Denies cervical spine pain  No radicular symptoms    She does get mild some numbness/tingling in the feet on occasion    Retired  Has been active most of her life, walking    REVIEW OF SYSTEMS  No Nausea, No Vomiting, No Chest Pain, No Shortness of Breath, No Dizziness, No Headache    PAST MEDICAL HISTORY:   History reviewed. No pertinent past medical history.    PMH:  has a past medical history of CAD (coronary artery disease) and Hypothyroid.  MEDS:   Current Outpatient Medications:   •  aspirin 81 MG EC tablet, Take 81 mg by mouth every day., Disp: , Rfl:   •  rivastigmine (EXELON) 9.5 MG/24HR patch, Place 1 Patch on the skin every day., Disp: , Rfl:   •  Icosapent Ethyl 1 g Cap, Take 2 Caps by mouth., Disp: , Rfl:   •  ondansetron (ZOFRAN ODT) 4 MG TABLET DISPERSIBLE, Take 1 Tab by mouth every 8 hours as needed., Disp: 6 Tab, Rfl: 0  •  LEVOTHYROXINE SODIUM PO, Take  by mouth., Disp: , Rfl:   ALLERGIES:   Allergies   Allergen Reactions   • Pcn [Penicillins] Rash     RAsh     SURGHX: No past surgical history on file.  SOCHX:  reports that she has never smoked. She  "has never used smokeless tobacco. She reports that she does not drink alcohol and does not use drugs.  FH: Family history was reviewed, no pertinent findings to report     PHYSICAL EXAM:  /78 (BP Location: Left arm, Patient Position: Sitting, BP Cuff Size: Adult)   Pulse 87   Temp 36.5 °C (97.7 °F) (Temporal)   Resp 18   Ht 1.473 m (4' 10\")   Wt 53.9 kg (118 lb 12.8 oz)   SpO2 96%   BMI 24.83 kg/m²      well-developed, well-nourished in no apparent distress, alert and oriented x 3.  Gait: normal    Cervical spine:  Range of motion Slightly limited with Extension and Slightly limited with Lateral rotation  Spurling's testing is POSITIVE with pain radiating into the shoulder and efrain-scapular region  Cervical spine tenderness POSITIVE at the level of C5    Strength testing:     Deltoid, bilateral 5/5  Bicep, bilateral 5/5  Tricep, bilateral 5/5  Wrist Extension, bilateral 5/5  Wrist Flexion, bilateral 5/5  Finger Abduction, bilateral 5/5    Sensation:  INTACT Bilaterally    Reflexes:   Biceps: R 2+/L 2+  Triceps: R 2+/L  2+  Brachial radialis R 2+/L  2+  Carpenter's testing is NEGATIVE  The arms are otherwise neurovascularly intact     Shoulder Exam:    RIGHT Shoulder:  No visible swelling   Range of motion INTACT  Tenderness: Non-tender  Empty Can Testing 5/5  Internal Rotation 5/5  External Rotation 5/5  Lift Off Testing 5/5  Impingement testing Galan  NEGATIVE  Neer's testing NEGATIVE  Apprehension testing NEGATIVE    LEFT Shoulder:  No visible swelling   Range of motion DIMINISHED with pain  Tenderness: Supraspinatous tenderness  Empty Can Testing 4/5 with pain  Internal Rotation 4/5 with pain  External Rotation 5/5  Lift Off Testing 4/5  Impingement testing Galan  POSITIVE  Neer's testing POSITIVE  Apprehension testing POSITIVE    Additional Findings: Flexed Posture      1. Acute pain of left shoulder  DX-HUMERUS 2+ LEFT    REFERRAL TO PHYSICAL THERAPY    CANCELED: DX-O-ARM   2. 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 Arm pain, left  DX-HUMERUS 2+ LEFT    CANCELED: DX-O-ARM   4. Hypothyroidism due to acquired atrophy of thyroid  AMB REFERRAL TO ESTABLISH WITH [facility]     Initial fall back in early June 2021 prior to her urgent care visit  Since urgent care visit she sustained a NEW fall in mid July 2021  Pain is at the deltoid region    Patient obtained original x-rays BEFORE her second fall which is why we repeated LEFT arm x-rays in the office TODAY (August 19, 2021)    Recommended acetaminophen for pain  Advised against stronger pain medication since she already has a high fall risk at baseline    Referral for formal physical therapy ([facility])    PCP referral placed    Return in about 4 weeks (around 9/16/2021).  To see how her shoulder is doing at that time  Consider subacromial corticosteroid injection of the LEFT shoulder symptoms persist or fail to improve        8/19/2021 3:07 PM     HISTORY/REASON FOR EXAM:  Pain following trauma.     TECHNIQUE/EXAM DESCRIPTION AND NUMBER OF VIEWS:  2 views of the LEFT humerus.     COMPARISON: 7/8/2021     FINDINGS:     BONE MINERALIZATION: Decreased.  JOINTS: Mild acromioclavicular osteoarthrosis. No erosions.  FRACTURE: None.  DISLOCATION: None.  SOFT TISSUES: No mass.     IMPRESSION:     No fracture or dislocation.        Exam Ended: 08/19/21  3:30 PM Last Resulted: 08/19/21  3:38 PM                8/19/2021 3:07 PM     HISTORY/REASON FOR EXAM:  Pain in cervical spine.        TECHNIQUE/EXAM DESCRIPTION AND NUMBER OF VIEWS:  Cervical spine series, 3 views.     COMPARISON:  None.        FINDINGS:  There is no evidence of acute fracture in the cervical spine. No focal compression fractures are noted.  There is mild anterolisthesis at C4-C5 and C7-T1.  There is evidence of moderate degenerative disc disease and facet arthropathy.  No soft tissue abnormality is  identified.     IMPRESSION:     1.  No compression deformity or acute fracture is identified.     2.  There is moderate degenerative disc disease and facet arthropathy.     3.  Mild anterolisthesis at C4-C5 and C7-T1 appears to be due to facet arthropathy.        Exam Ended: 08/19/21  3:30 PM Last Resulted: 08/19/21  3:33 PM          Interpreted in the office today with the patient      7/8/2021 10:56 AM     HISTORY/REASON FOR EXAM:  Pain/Deformity Following Trauma.  Injury one month ago     TECHNIQUE/EXAM DESCRIPTION AND NUMBER OF VIEWS:  2 views of the LEFT humerus.     COMPARISON: None     FINDINGS:  No acute fracture or subluxation is seen.     Osteopenia is present, decreasing sensitivity of the study for detection of acute displaced fracture.     Some lateral acromion downsloping but no inferior spurring is confirmed     IMPRESSION:     No radiographic evidence of acute or subacute traumatic injury.     Osteopenia        Exam Ended: 07/08/21 10:56 AM Last Resulted: 07/08/21 11:20 AM             6/4/2021 2:12 PM     HISTORY/REASON FOR EXAM:  Pain/Deformity Following Trauma        TECHNIQUE/EXAM DESCRIPTION AND NUMBER OF VIEWS:  3 views of the RIGHT ankle.     COMPARISON: None     FINDINGS:  There is no evidence of fracture or dislocation.  The ankle mortise is well-maintained. The talar dome is preserved.  No substantial soft tissue swelling is noted. There is spurring of the calcaneus at the insertion of the Achilles tendon.        IMPRESSION:     1.  No evidence of fracture or dislocation.  2.  Calcaneal spurring.        Exam Ended: 06/04/21  2:36 PM Last Resulted: 06/04/21  2:38 PM        6/4/2021 2:18 PM     HISTORY/REASON FOR EXAM:  Pain/Deformity Following Trauma        TECHNIQUE/EXAM DESCRIPTION AND NUMBER OF VIEWS:  3 views of the RIGHT knee.     COMPARISON: None     FINDINGS:  There is no evidence of fracture or dislocation. There is mild tibiofemoral joint space narrowing, particularly medially. No  joint effusion is seen. Bones are demineralized. Atherosclerotic calcification is seen.     IMPRESSION:     1.  No evidence of acute fracture or dislocation.  2.  Demineralization.  3.  Mild Degenerative changes.  4.  Atherosclerotic plaque.           Exam Ended: 06/04/21  2:35 PM Last Resulted: 06/04/21  2:38 PM           Interpreted in the office today with the patient    Thank you Michael Hutchins M.D. for allowing me to participate in caring for your patient.    Greater than 60 minutes was spent reviewing patient history, discussing current issue, physical examination, reviewing results and documenting the visit.

## 2021-09-16 ENCOUNTER — OFFICE VISIT (OUTPATIENT)
Dept: MEDICAL GROUP | Facility: CLINIC | Age: 86
End: 2021-09-16
Payer: MEDICARE

## 2021-09-16 VITALS
DIASTOLIC BLOOD PRESSURE: 78 MMHG | WEIGHT: 118.8 LBS | RESPIRATION RATE: 18 BRPM | OXYGEN SATURATION: 96 % | HEART RATE: 78 BPM | BODY MASS INDEX: 24.94 KG/M2 | HEIGHT: 58 IN | TEMPERATURE: 98.3 F | SYSTOLIC BLOOD PRESSURE: 118 MMHG

## 2021-09-16 DIAGNOSIS — M79.602 ARM PAIN, LEFT: ICD-10-CM

## 2021-09-16 DIAGNOSIS — M75.82 ROTATOR CUFF TENDINITIS, LEFT: ICD-10-CM

## 2021-09-16 DIAGNOSIS — M47.812 SPONDYLOSIS OF CERVICAL REGION WITHOUT MYELOPATHY OR RADICULOPATHY: ICD-10-CM

## 2021-09-16 PROCEDURE — 99213 OFFICE O/P EST LOW 20 MIN: CPT | Mod: 25 | Performed by: FAMILY MEDICINE

## 2021-09-16 RX ORDER — TRIAMCINOLONE ACETONIDE 40 MG/ML
40 INJECTION, SUSPENSION INTRA-ARTICULAR; INTRAMUSCULAR ONCE
Status: COMPLETED | OUTPATIENT
Start: 2021-09-16 | End: 2021-09-16

## 2021-09-16 RX ADMIN — TRIAMCINOLONE ACETONIDE 40 MG: 40 INJECTION, SUSPENSION INTRA-ARTICULAR; INTRAMUSCULAR at 14:37

## 2021-09-16 ASSESSMENT — FIBROSIS 4 INDEX: FIB4 SCORE: 1.44

## 2021-09-16 NOTE — PROGRESS NOTES
"CHIEF COMPLAINT:  Digna Villar female presenting at the request of Michael Hutchins M.D.  for evaluation of Shoulder pain.     Digna Villar is complaining of left shoulder pain  Initial fall back in early June 2021 prior to her urgent care visit  Since urgent care visit she sustained a NEW fall in mid July 2021  Pain is at the deltoid region  Quality is aching  Pain is Non-radiating  Aggravated by Lifting the arm and lying on the LEFT side  Improved with  rest as well as use of shoulder sling, but she feels uncomfortable wearing it and only wears it intermittently  no prior problems with this shoulder in the past  Prior Treatments: seen at   Prior studies: X-Ray, but she sustained a NEW fall since her most recent urgent care x-ray  Medications tried for pain include: naproxen (OTC), which helps minimally  Mechanical Symptom history: Popping  Denies cervical spine pain  No radicular symptoms    She does get mild some numbness/tingling in the feet on occasion    Retired  Has been active most of her life, walking     PHYSICAL EXAM:  /78 (BP Location: Left arm, Patient Position: Sitting, BP Cuff Size: Adult)   Pulse 78   Temp 36.8 °C (98.3 °F) (Temporal)   Resp 18   Ht 1.473 m (4' 10\")   Wt 53.9 kg (118 lb 12.8 oz)   SpO2 96%   BMI 24.83 kg/m²        well-developed, well-nourished in no apparent distress, alert and oriented x 3.  Gait: normal    Cervical spine:  Range of motion Slightly limited with Extension and Slightly limited with Lateral rotation  Spurling's testing is POSITIVE with pain radiating into the shoulder and efrain-scapular region  Cervical spine tenderness POSITIVE at the level of C5    Shoulder Exam:    RIGHT Shoulder:  No visible swelling   Range of motion INTACT  Tenderness: Non-tender  Empty Can Testing 5/5  Internal Rotation 5/5  External Rotation 5/5  Lift Off Testing 5/5  Impingement testing Galan  NEGATIVE  Neer's testing NEGATIVE  Apprehension testing NEGATIVE    LEFT " Shoulder:  No visible swelling   Range of motion DIMINISHED with pain  Tenderness: Supraspinatous tenderness  Empty Can Testing 4/5 with pain  Internal Rotation 4/5 with pain  External Rotation 5/5  Lift Off Testing 4/5  Impingement testing Galan  POSITIVE  Neer's testing POSITIVE  Apprehension testing POSITIVE     Additional Findings: Flexed Posture    1. Rotator cuff tendinitis, left  triamcinolone acetonide (KENALOG-40) injection 40 mg   2. Spondylosis of cervical region without myelopathy or radiculopathy     3. Arm pain, left       Initial fall back in early June 2021 prior to her urgent care visit  Since urgent care visit she sustained a NEW fall in mid July 2021  Pain is at the deltoid region    Patient obtained original x-rays BEFORE her second fall which is why we repeated LEFT arm x-rays  (August 19, 2021)    Recommended acetaminophen for pain  Advised against stronger pain medication since she already has a high fall risk at baseline    She already started formal physical therapy ([facility])    Return in about 4 weeks (around 10/14/2021).  To see how she is doing after LEFT subacromial corticosteroid injection         8/19/2021 3:07 PM     HISTORY/REASON FOR EXAM:  Pain following trauma.     TECHNIQUE/EXAM DESCRIPTION AND NUMBER OF VIEWS:  2 views of the LEFT humerus.     COMPARISON: 7/8/2021     FINDINGS:     BONE MINERALIZATION: Decreased.  JOINTS: Mild acromioclavicular osteoarthrosis. No erosions.  FRACTURE: None.  DISLOCATION: None.  SOFT TISSUES: No mass.     IMPRESSION:     No fracture or dislocation.        Exam Ended: 08/19/21  3:30 PM Last Resulted: 08/19/21  3:38 PM                8/19/2021 3:07 PM     HISTORY/REASON FOR EXAM:  Pain in cervical spine.        TECHNIQUE/EXAM DESCRIPTION AND NUMBER OF VIEWS:  Cervical spine series, 3 views.     COMPARISON:  None.        FINDINGS:  There is no evidence of acute fracture in the cervical spine. No focal compression fractures are noted.  There is  mild anterolisthesis at C4-C5 and C7-T1.  There is evidence of moderate degenerative disc disease and facet arthropathy.  No soft tissue abnormality is identified.     IMPRESSION:     1.  No compression deformity or acute fracture is identified.     2.  There is moderate degenerative disc disease and facet arthropathy.     3.  Mild anterolisthesis at C4-C5 and C7-T1 appears to be due to facet arthropathy.        Exam Ended: 08/19/21  3:30 PM Last Resulted: 08/19/21  3:33 PM          Interpreted in the office today with the patient      7/8/2021 10:56 AM     HISTORY/REASON FOR EXAM:  Pain/Deformity Following Trauma.  Injury one month ago     TECHNIQUE/EXAM DESCRIPTION AND NUMBER OF VIEWS:  2 views of the LEFT humerus.     COMPARISON: None     FINDINGS:  No acute fracture or subluxation is seen.     Osteopenia is present, decreasing sensitivity of the study for detection of acute displaced fracture.     Some lateral acromion downsloping but no inferior spurring is confirmed     IMPRESSION:     No radiographic evidence of acute or subacute traumatic injury.     Osteopenia        Exam Ended: 07/08/21 10:56 AM Last Resulted: 07/08/21 11:20 AM             6/4/2021 2:12 PM     HISTORY/REASON FOR EXAM:  Pain/Deformity Following Trauma        TECHNIQUE/EXAM DESCRIPTION AND NUMBER OF VIEWS:  3 views of the RIGHT ankle.     COMPARISON: None     FINDINGS:  There is no evidence of fracture or dislocation.  The ankle mortise is well-maintained. The talar dome is preserved.  No substantial soft tissue swelling is noted. There is spurring of the calcaneus at the insertion of the Achilles tendon.        IMPRESSION:     1.  No evidence of fracture or dislocation.  2.  Calcaneal spurring.        Exam Ended: 06/04/21  2:36 PM Last Resulted: 06/04/21  2:38 PM        6/4/2021 2:18 PM     HISTORY/REASON FOR EXAM:  Pain/Deformity Following Trauma        TECHNIQUE/EXAM DESCRIPTION AND NUMBER OF VIEWS:  3 views of the RIGHT  knee.     COMPARISON: None     FINDINGS:  There is no evidence of fracture or dislocation. There is mild tibiofemoral joint space narrowing, particularly medially. No joint effusion is seen. Bones are demineralized. Atherosclerotic calcification is seen.     IMPRESSION:     1.  No evidence of acute fracture or dislocation.  2.  Demineralization.  3.  Mild Degenerative changes.  4.  Atherosclerotic plaque.           Exam Ended: 06/04/21  2:35 PM Last Resulted: 06/04/21  2:38 PM           Thank you Michael Hutchins M.D. for allowing me to participate in caring for your patient.

## 2021-10-07 ENCOUNTER — OFFICE VISIT (OUTPATIENT)
Dept: MEDICAL GROUP | Facility: PHYSICIAN GROUP | Age: 86
End: 2021-10-07
Payer: MEDICARE

## 2021-10-07 VITALS
SYSTOLIC BLOOD PRESSURE: 126 MMHG | TEMPERATURE: 97.1 F | HEART RATE: 104 BPM | BODY MASS INDEX: 23.72 KG/M2 | OXYGEN SATURATION: 94 % | DIASTOLIC BLOOD PRESSURE: 82 MMHG | HEIGHT: 58 IN | WEIGHT: 113 LBS

## 2021-10-07 DIAGNOSIS — Z00.00 WELL WOMAN EXAM (NO GYNECOLOGICAL EXAM): ICD-10-CM

## 2021-10-07 DIAGNOSIS — E03.4 HYPOTHYROIDISM DUE TO ACQUIRED ATROPHY OF THYROID: ICD-10-CM

## 2021-10-07 DIAGNOSIS — G44.89 OTHER HEADACHE SYNDROME: ICD-10-CM

## 2021-10-07 DIAGNOSIS — Z87.440 HISTORY OF RECURRENT UTIS: ICD-10-CM

## 2021-10-07 DIAGNOSIS — F02.80 LATE ONSET ALZHEIMER'S DEMENTIA WITHOUT BEHAVIORAL DISTURBANCE (HCC): ICD-10-CM

## 2021-10-07 DIAGNOSIS — G30.1 LATE ONSET ALZHEIMER'S DEMENTIA WITHOUT BEHAVIORAL DISTURBANCE (HCC): ICD-10-CM

## 2021-10-07 PROCEDURE — 99214 OFFICE O/P EST MOD 30 MIN: CPT | Performed by: FAMILY MEDICINE

## 2021-10-07 SDOH — ECONOMIC STABILITY: HOUSING INSECURITY
IN THE LAST 12 MONTHS, WAS THERE A TIME WHEN YOU DID NOT HAVE A STEADY PLACE TO SLEEP OR SLEPT IN A SHELTER (INCLUDING NOW)?: PATIENT REFUSED

## 2021-10-07 SDOH — ECONOMIC STABILITY: TRANSPORTATION INSECURITY
IN THE PAST 12 MONTHS, HAS LACK OF RELIABLE TRANSPORTATION KEPT YOU FROM MEDICAL APPOINTMENTS, MEETINGS, WORK OR FROM GETTING THINGS NEEDED FOR DAILY LIVING?: NO

## 2021-10-07 SDOH — ECONOMIC STABILITY: TRANSPORTATION INSECURITY
IN THE PAST 12 MONTHS, HAS LACK OF TRANSPORTATION KEPT YOU FROM MEETINGS, WORK, OR FROM GETTING THINGS NEEDED FOR DAILY LIVING?: NO

## 2021-10-07 SDOH — ECONOMIC STABILITY: HOUSING INSECURITY
IN THE LAST 12 MONTHS, WAS THERE A TIME WHEN YOU DID NOT HAVE A STEADY PLACE TO SLEEP OR SLEPT IN A SHELTER (INCLUDING NOW)?: NO

## 2021-10-07 SDOH — HEALTH STABILITY: MENTAL HEALTH
STRESS IS WHEN SOMEONE FEELS TENSE, NERVOUS, ANXIOUS, OR CAN'T SLEEP AT NIGHT BECAUSE THEIR MIND IS TROUBLED. HOW STRESSED ARE YOU?: NOT AT ALL

## 2021-10-07 SDOH — ECONOMIC STABILITY: FOOD INSECURITY: WITHIN THE PAST 12 MONTHS, THE FOOD YOU BOUGHT JUST DIDN'T LAST AND YOU DIDN'T HAVE MONEY TO GET MORE.: NEVER TRUE

## 2021-10-07 SDOH — ECONOMIC STABILITY: INCOME INSECURITY: IN THE LAST 12 MONTHS, WAS THERE A TIME WHEN YOU WERE NOT ABLE TO PAY THE MORTGAGE OR RENT ON TIME?: PATIENT REFUSED

## 2021-10-07 SDOH — ECONOMIC STABILITY: HOUSING INSECURITY: IN THE LAST 12 MONTHS, HOW MANY PLACES HAVE YOU LIVED?: 2

## 2021-10-07 SDOH — HEALTH STABILITY: PHYSICAL HEALTH: ON AVERAGE, HOW MANY DAYS PER WEEK DO YOU ENGAGE IN MODERATE TO STRENUOUS EXERCISE (LIKE A BRISK WALK)?: 0 DAYS

## 2021-10-07 SDOH — HEALTH STABILITY: PHYSICAL HEALTH: ON AVERAGE, HOW MANY MINUTES DO YOU ENGAGE IN EXERCISE AT THIS LEVEL?: 0 MIN

## 2021-10-07 SDOH — ECONOMIC STABILITY: INCOME INSECURITY: HOW HARD IS IT FOR YOU TO PAY FOR THE VERY BASICS LIKE FOOD, HOUSING, MEDICAL CARE, AND HEATING?: SOMEWHAT HARD

## 2021-10-07 SDOH — ECONOMIC STABILITY: TRANSPORTATION INSECURITY
IN THE PAST 12 MONTHS, HAS THE LACK OF TRANSPORTATION KEPT YOU FROM MEDICAL APPOINTMENTS OR FROM GETTING MEDICATIONS?: NO

## 2021-10-07 SDOH — ECONOMIC STABILITY: FOOD INSECURITY: WITHIN THE PAST 12 MONTHS, YOU WORRIED THAT YOUR FOOD WOULD RUN OUT BEFORE YOU GOT MONEY TO BUY MORE.: NEVER TRUE

## 2021-10-07 ASSESSMENT — SOCIAL DETERMINANTS OF HEALTH (SDOH)
HOW HARD IS IT FOR YOU TO PAY FOR THE VERY BASICS LIKE FOOD, HOUSING, MEDICAL CARE, AND HEATING?: SOMEWHAT HARD
HOW OFTEN DO YOU ATTENT MEETINGS OF THE CLUB OR ORGANIZATION YOU BELONG TO?: 1 TO 4 TIMES PER YEAR
IN A TYPICAL WEEK, HOW MANY TIMES DO YOU TALK ON THE PHONE WITH FAMILY, FRIENDS, OR NEIGHBORS?: MORE THAN THREE TIMES A WEEK
HOW MANY DRINKS CONTAINING ALCOHOL DO YOU HAVE ON A TYPICAL DAY WHEN YOU ARE DRINKING: PATIENT DECLINED
HOW OFTEN DO YOU ATTEND CHURCH OR RELIGIOUS SERVICES?: MORE THAN 4 TIMES PER YEAR
DO YOU BELONG TO ANY CLUBS OR ORGANIZATIONS SUCH AS CHURCH GROUPS UNIONS, FRATERNAL OR ATHLETIC GROUPS, OR SCHOOL GROUPS?: NO
HOW OFTEN DO YOU ATTENT MEETINGS OF THE CLUB OR ORGANIZATION YOU BELONG TO?: 1 TO 4 TIMES PER YEAR
HOW OFTEN DO YOU HAVE SIX OR MORE DRINKS ON ONE OCCASION: NEVER
HOW OFTEN DO YOU GET TOGETHER WITH FRIENDS OR RELATIVES?: TWICE A WEEK
DO YOU BELONG TO ANY CLUBS OR ORGANIZATIONS SUCH AS CHURCH GROUPS UNIONS, FRATERNAL OR ATHLETIC GROUPS, OR SCHOOL GROUPS?: NO
IN A TYPICAL WEEK, HOW MANY TIMES DO YOU TALK ON THE PHONE WITH FAMILY, FRIENDS, OR NEIGHBORS?: MORE THAN THREE TIMES A WEEK
WITHIN THE PAST 12 MONTHS, YOU WORRIED THAT YOUR FOOD WOULD RUN OUT BEFORE YOU GOT THE MONEY TO BUY MORE: NEVER TRUE
HOW OFTEN DO YOU HAVE A DRINK CONTAINING ALCOHOL: NEVER
HOW OFTEN DO YOU ATTEND CHURCH OR RELIGIOUS SERVICES?: MORE THAN 4 TIMES PER YEAR
HOW OFTEN DO YOU GET TOGETHER WITH FRIENDS OR RELATIVES?: TWICE A WEEK

## 2021-10-07 ASSESSMENT — LIFESTYLE VARIABLES
HOW OFTEN DO YOU HAVE A DRINK CONTAINING ALCOHOL: NEVER
HOW OFTEN DO YOU HAVE SIX OR MORE DRINKS ON ONE OCCASION: NEVER
HOW MANY STANDARD DRINKS CONTAINING ALCOHOL DO YOU HAVE ON A TYPICAL DAY: PATIENT DECLINED

## 2021-10-07 NOTE — ASSESSMENT & PLAN NOTE
Chronic issue  The patient has a hx of dementia diagnosed in [place]   She lives with daughter  She is able to cook and is autonomous   She is forgetful and often forgets to take her meds  She is able to complete ADLs

## 2021-10-07 NOTE — ASSESSMENT & PLAN NOTE
New issue  Started having a headache starting today  Last weekend she also had a headaches  Sometimes she takes NSAIDs/APAP which help   She doesn't have headaches but now has developed one  Denies any head trauma  Denies any vision  Denies any neurological problems

## 2021-10-07 NOTE — PROGRESS NOTES
Subjective:     Chief Complaint   Patient presents with   • Establish Care   • Other     beginnning stages of alzheimers   • Other     seeing Phyical therpy   • GI Problem     today    • Headache     today       HPI:   Digna presents today to discuss the following.    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)  Chronic issue  The patient has a hx of dementia diagnosed in [place]   She lives with daughter  She is able to cook and is autonomous   She is forgetful and often forgets to take her meds  She is able to complete ADLs     History of recurrent UTIs  Chronic issue  Gets frequent UTIs last one in 6/21  Family would like urology referral    Other headache syndrome  New issue  Started having a headache starting today  Last weekend she also had a headaches  Sometimes she takes NSAIDs/APAP which help   She doesn't have headaches but now has developed one  Denies any head trauma  Denies any vision  Denies any neurological problems      Past Medical History:   Diagnosis Date   • CAD (coronary artery disease)    • Hypothyroid        Current Outpatient Medications Ordered in Epic   Medication Sig Dispense Refill   • Cholecalciferol 1.25 MG (65836 UT) Tab Take  by mouth.     • aspirin 81 MG EC tablet Take 81 mg by mouth every day.     • LEVOTHYROXINE SODIUM PO Take  by mouth.     • rivastigmine (EXELON) 9.5 MG/24HR patch Place 1 Patch on the skin every day. (Patient not taking: Reported on 10/7/2021)     • Icosapent Ethyl 1 g Cap Take 2 Caps by mouth.     • ondansetron (ZOFRAN ODT) 4 MG TABLET DISPERSIBLE Take 1 Tab by mouth every 8 hours as needed. 6 Tab 0     No current Epic-ordered facility-administered medications on file.       Allergies:  Pcn [penicillins]    Health Maintenance: Completed    ROS:  Gen: no fevers/chills, no changes in weight  Eyes: no changes in vision  Pulm: no sob, no cough  CV: no chest pain, no palpitations  GI: no nausea/vomiting, no diarrhea      Objective:  "    Exam:  /82 (BP Location: Left arm, Patient Position: Sitting, BP Cuff Size: Adult)   Pulse (!) 104   Temp 36.2 °C (97.1 °F) (Temporal)   Ht 1.473 m (4' 10\")   Wt 51.3 kg (113 lb)   SpO2 94%   BMI 23.62 kg/m²  Body mass index is 23.62 kg/m².    Constitutional: Alert, no distress, well-groomed.  Skin: Warm, dry, good turgor, no rashes in visible areas.  Eye: Equal, round and reactive, conjunctiva clear, lids normal.  ENMT: Lips without lesions, good dentition, moist mucous membranes.  Neck: Trachea midline, no masses, no thyromegaly.  Respiratory: Unlabored respiratory effort, no cough.  MSK: Normal gait, moves all extremities.  Neuro: Grossly non-focal.   Psych: Alert and oriented x3, normal affect and mood.        Assessment & Plan:     86 y.o. female with the following -     1. 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  The patient has a history of dementia as reported by family.  Reportedly, she was diagnosed in [facility].  She is fairly independent and is able to complete her activities of daily living.  She does need reminders to continue to take her medication.  I would like her to establish locally with neurology for ongoing management.  She was on donepezil/memantine but it is recorded as intolerance in her allergy list.  She was also on rivastigmine but she claims to no longer take.  I will also like to order baseline blood work to rule out any secondary causes to her memory loss.  - REFERRAL TO NEUROLOGY  - CBC WITHOUT DIFFERENTIAL; Future  - Comp Metabolic Panel; Future  - Lipid Profile; Future  - TSH WITH REFLEX TO FT4; Future  - HEMOGLOBIN A1C; Future    2. History of recurrent UTIs  Chronic, stable condition.  Has a history of recurrent UTIs.  Last UTI it was June 2021.  She is not on any medications at this time.  I will refer to urology for possible prophylactic antibiotic.  - REFERRAL TO UROLOGY    3. Other headache " syndrome  This is a new problem.  The patient does not have a history of headaches however over the past week or so she has been complaining of persistent headache.  Denies any red flag symptoms at this time but given the fact that this is a new headache in an elderly patient will proceed with MRI of the brain.  - MR-BRAIN-W/O; Future      Return in about 3 months (around 1/7/2022).    Please note that this dictation was created using voice recognition software. I have made every reasonable attempt to correct obvious errors, but I expect that there are errors of grammar and possibly content that I did not discover before finalizing the note.

## 2021-10-14 ENCOUNTER — OFFICE VISIT (OUTPATIENT)
Dept: SPORTS MEDICINE | Facility: CLINIC | Age: 86
End: 2021-10-14
Payer: MEDICARE

## 2021-10-14 VITALS
WEIGHT: 113 LBS | DIASTOLIC BLOOD PRESSURE: 74 MMHG | TEMPERATURE: 98.5 F | SYSTOLIC BLOOD PRESSURE: 116 MMHG | HEIGHT: 58 IN | BODY MASS INDEX: 23.72 KG/M2 | HEART RATE: 78 BPM | RESPIRATION RATE: 18 BRPM | OXYGEN SATURATION: 96 %

## 2021-10-14 DIAGNOSIS — M75.82 ROTATOR CUFF TENDINITIS, LEFT: ICD-10-CM

## 2021-10-14 DIAGNOSIS — M75.42 ROTATOR CUFF IMPINGEMENT SYNDROME, LEFT: ICD-10-CM

## 2021-10-14 DIAGNOSIS — M47.812 SPONDYLOSIS OF CERVICAL REGION WITHOUT MYELOPATHY OR RADICULOPATHY: ICD-10-CM

## 2021-10-14 PROCEDURE — 99213 OFFICE O/P EST LOW 20 MIN: CPT | Performed by: FAMILY MEDICINE

## 2021-10-14 NOTE — PROGRESS NOTES
"CHIEF COMPLAINT:  Digna Villar female presenting at the request of Michael Hutchins M.D.  for evaluation of Shoulder pain.     Digna Villar is complaining of left shoulder pain  Initial fall back in early June 2021 prior to her urgent care visit  Since urgent care visit she sustained a NEW fall in mid July 2021  Pain is at the deltoid region  Quality is aching  Pain is Non-radiating  Aggravated by Lifting the arm and lying on the LEFT side  Improved with  rest as well as use of shoulder sling, but she feels uncomfortable wearing it and only wears it intermittently  no prior problems with this shoulder in the past    Minimal improvement with physical therapy, actually has more pain after she leaves therapy  Corticosteroid injection did NOT help much either    She does get mild some numbness/tingling in the feet on occasion    Retired  Has been active most of her life, walking     PHYSICAL EXAM:  /74 (BP Location: Left arm, Patient Position: Sitting, BP Cuff Size: Adult)   Pulse 78   Temp 36.9 °C (98.5 °F) (Temporal)   Resp 18   Ht 1.473 m (4' 10\")   Wt 51.3 kg (113 lb)   SpO2 96%   BMI 23.62 kg/m²        well-developed, well-nourished in no apparent distress, alert and oriented x 3.  Gait: normal    Cervical spine:  Range of motion Slightly limited with Extension and Slightly limited with Lateral rotation  Spurling's testing is POSITIVE with pain radiating into the shoulder and efrain-scapular region  Cervical spine tenderness POSITIVE at the level of C5    Shoulder Exam:    RIGHT Shoulder:  No visible swelling   Range of motion INTACT  Tenderness: Non-tender  Empty Can Testing 5/5  Internal Rotation 5/5  External Rotation 5/5  Lift Off Testing 5/5  Impingement testing Galan  NEGATIVE  Neer's testing NEGATIVE  Apprehension testing NEGATIVE    LEFT Shoulder:  No visible swelling   Range of motion DIMINISHED with pain  Tenderness: Supraspinatous tenderness  Empty Can Testing 4/5 with pain  Internal " Rotation 4/5 with pain  External Rotation 5/5  Lift Off Testing 4/5  Impingement testing Galan  POSITIVE  Neer's testing POSITIVE  Apprehension testing POSITIVE     Additional Findings: Flexed Posture    1. Rotator cuff tendinitis, left  MR-SHOULDER-W/O LEFT   2. Rotator cuff impingement syndrome, left  MR-SHOULDER-W/O LEFT   3. Spondylosis of cervical region without myelopathy or radiculopathy       Initial fall back in early June 2021 prior to her urgent care visit  Since urgent care visit she sustained a NEW fall in mid July 2021  Pain is at the deltoid region    Patient obtained original x-rays BEFORE her second fall which is why we repeated LEFT arm x-rays  (August 19, 2021)    Continue acetaminophen for pain  Advised against stronger pain medication since she already has a high fall risk at baseline  Unfortunately she did not experience very much improvement after LEFT subacromial corticosteroid injection performed September 16, 2021  Since she still has supraspinatus weakness and POSITIVE impingement signs despite exercises/therapy, which seem to be worsening her symptoms, recommend MRI of the LEFT shoulder which was ordered    Continue formal physical therapy ([facility])    Follow-up after the MRI of the shoulder to discuss results and further management options         8/19/2021 3:07 PM     HISTORY/REASON FOR EXAM:  Pain following trauma.     TECHNIQUE/EXAM DESCRIPTION AND NUMBER OF VIEWS:  2 views of the LEFT humerus.     COMPARISON: 7/8/2021     FINDINGS:     BONE MINERALIZATION: Decreased.  JOINTS: Mild acromioclavicular osteoarthrosis. No erosions.  FRACTURE: None.  DISLOCATION: None.  SOFT TISSUES: No mass.     IMPRESSION:     No fracture or dislocation.        Exam Ended: 08/19/21  3:30 PM Last Resulted: 08/19/21  3:38 PM                8/19/2021 3:07 PM     HISTORY/REASON FOR EXAM:  Pain in cervical spine.        TECHNIQUE/EXAM DESCRIPTION AND NUMBER OF VIEWS:  Cervical spine series, 3  views.     COMPARISON:  None.        FINDINGS:  There is no evidence of acute fracture in the cervical spine. No focal compression fractures are noted.  There is mild anterolisthesis at C4-C5 and C7-T1.  There is evidence of moderate degenerative disc disease and facet arthropathy.  No soft tissue abnormality is identified.     IMPRESSION:     1.  No compression deformity or acute fracture is identified.     2.  There is moderate degenerative disc disease and facet arthropathy.     3.  Mild anterolisthesis at C4-C5 and C7-T1 appears to be due to facet arthropathy.        Exam Ended: 08/19/21  3:30 PM Last Resulted: 08/19/21  3:33 PM          Interpreted in the office today with the patient      7/8/2021 10:56 AM     HISTORY/REASON FOR EXAM:  Pain/Deformity Following Trauma.  Injury one month ago     TECHNIQUE/EXAM DESCRIPTION AND NUMBER OF VIEWS:  2 views of the LEFT humerus.     COMPARISON: None     FINDINGS:  No acute fracture or subluxation is seen.     Osteopenia is present, decreasing sensitivity of the study for detection of acute displaced fracture.     Some lateral acromion downsloping but no inferior spurring is confirmed     IMPRESSION:     No radiographic evidence of acute or subacute traumatic injury.     Osteopenia        Exam Ended: 07/08/21 10:56 AM Last Resulted: 07/08/21 11:20 AM             6/4/2021 2:12 PM     HISTORY/REASON FOR EXAM:  Pain/Deformity Following Trauma        TECHNIQUE/EXAM DESCRIPTION AND NUMBER OF VIEWS:  3 views of the RIGHT ankle.     COMPARISON: None     FINDINGS:  There is no evidence of fracture or dislocation.  The ankle mortise is well-maintained. The talar dome is preserved.  No substantial soft tissue swelling is noted. There is spurring of the calcaneus at the insertion of the Achilles tendon.        IMPRESSION:     1.  No evidence of fracture or dislocation.  2.  Calcaneal spurring.        Exam Ended: 06/04/21  2:36 PM Last Resulted: 06/04/21  2:38 PM        6/4/2021  2:18 PM     HISTORY/REASON FOR EXAM:  Pain/Deformity Following Trauma        TECHNIQUE/EXAM DESCRIPTION AND NUMBER OF VIEWS:  3 views of the RIGHT knee.     COMPARISON: None     FINDINGS:  There is no evidence of fracture or dislocation. There is mild tibiofemoral joint space narrowing, particularly medially. No joint effusion is seen. Bones are demineralized. Atherosclerotic calcification is seen.     IMPRESSION:     1.  No evidence of acute fracture or dislocation.  2.  Demineralization.  3.  Mild Degenerative changes.  4.  Atherosclerotic plaque.           Exam Ended: 06/04/21  2:35 PM Last Resulted: 06/04/21  2:38 PM           Thank you Michael Hutchins M.D. for allowing me to participate in caring for your patient.

## 2021-10-19 ENCOUNTER — HOSPITAL ENCOUNTER (OUTPATIENT)
Dept: RADIOLOGY | Facility: MEDICAL CENTER | Age: 86
End: 2021-10-19
Attending: FAMILY MEDICINE
Payer: MEDICARE

## 2021-10-19 DIAGNOSIS — G44.89 OTHER HEADACHE SYNDROME: ICD-10-CM

## 2021-10-19 PROCEDURE — 70551 MRI BRAIN STEM W/O DYE: CPT | Mod: ME

## 2021-10-20 ENCOUNTER — HOSPITAL ENCOUNTER (OUTPATIENT)
Dept: RADIOLOGY | Facility: MEDICAL CENTER | Age: 86
End: 2021-10-20
Attending: FAMILY MEDICINE
Payer: MEDICARE

## 2021-10-20 DIAGNOSIS — M75.42 ROTATOR CUFF IMPINGEMENT SYNDROME, LEFT: ICD-10-CM

## 2021-10-20 DIAGNOSIS — M75.82 ROTATOR CUFF TENDINITIS, LEFT: ICD-10-CM

## 2021-10-20 PROCEDURE — 73221 MRI JOINT UPR EXTREM W/O DYE: CPT | Mod: LT,ME

## 2021-10-21 ENCOUNTER — OFFICE VISIT (OUTPATIENT)
Dept: SPORTS MEDICINE | Facility: CLINIC | Age: 86
End: 2021-10-21
Payer: MEDICARE

## 2021-10-21 VITALS — HEIGHT: 58 IN | WEIGHT: 113 LBS | BODY MASS INDEX: 23.72 KG/M2

## 2021-10-21 DIAGNOSIS — M75.102 TEAR OF LEFT SUPRASPINATUS TENDON: ICD-10-CM

## 2021-10-21 DIAGNOSIS — S46.219A BICEPS TENDON TEAR: ICD-10-CM

## 2021-10-21 DIAGNOSIS — S43.431A GLENOID LABRUM TEAR, RIGHT, INITIAL ENCOUNTER: ICD-10-CM

## 2021-10-21 PROCEDURE — 99213 OFFICE O/P EST LOW 20 MIN: CPT | Performed by: FAMILY MEDICINE

## 2021-10-21 NOTE — PROGRESS NOTES
1. Tear of left supraspinatus tendon  REFERRAL TO ORTHOPEDICS   2. Glenoid labrum tear, right, initial encounter  REFERRAL TO ORTHOPEDICS   3. Biceps tendon tear  REFERRAL TO ORTHOPEDICS     Patient did NOT respond to subacromial corticosteroid injection  Is here to discuss MRI results    Given her continued pain, recommend orthopedic consultation to discuss surgical treatment options                10/20/2021 4:14 PM     HISTORY/REASON FOR EXAM:  Shoulder pain, rotator cuff disorder suspected, xray done. Fall months ago with continued pain     TECHNIQUE/EXAM DESCRIPTION:  MRI of the LEFT shoulder without contrast.     The study was performed on a [employer] 1.5 Shaina MRI scanner.     T1 sagittal, fast spin-echo T2 fat-suppressed oblique coronal, sagittal, and axial and intermediate fast spin-echo oblique coronal images were obtained.     COMPARISON: No radiograph or MRI     FINDINGS:  Glenohumeral joint space: There is a small joint effusion.   This communicates with the subdeltoid space.     Rotator cuff:  Complete supraspinatus tendon tear with over 2 cm retraction and no fatty atrophy. Mild infraspinatus edema.     Labrum:  Mild increased posterior labral signal with some associated glenoid spurring likely indicating degenerative tearing.     The long head biceps is notable for longitudinal increased signal.     Osseous structures/cartilage:  No acute fracture is seen.     There is mild diffuse glenohumeral cartilage thinning and glenohumeral osteophyte formation.     Mild cranial spurring at the acromioclavicular joint. There is minimal inferior spurring of the acromion     IMPRESSION:     1.  Complete supraspinatus tendon tear with retraction and no fatty atrophy     2.  Moderate infraspinatus tendinopathy     3.  Mild longitudinal splitting of the biceps tendon     4.  Mild glenohumeral osteoarthritis with some degenerative tearing of the labrum posteriorly     5.  Mild acromioclavicular joint  osteoarthritis        Exam Ended: 10/20/21  4:43 PM Last Resulted: 10/20/21  5:04 PM           Interpreted in the office today with the patient

## 2021-10-27 ENCOUNTER — TELEPHONE (OUTPATIENT)
Dept: SPORTS MEDICINE | Facility: CLINIC | Age: 86
End: 2021-10-27

## 2021-10-27 DIAGNOSIS — S43.431A GLENOID LABRUM TEAR, RIGHT, INITIAL ENCOUNTER: ICD-10-CM

## 2021-10-27 DIAGNOSIS — M75.102 TEAR OF LEFT SUPRASPINATUS TENDON: ICD-10-CM

## 2021-10-27 DIAGNOSIS — S46.219A BICEPS TENDON TEAR: ICD-10-CM

## 2021-10-27 NOTE — TELEPHONE ENCOUNTER
Pt sent to [facility], but there is no shoulder surgeon at this clinic.  Repeat referral sent so pt can consult about surgery.

## 2021-11-24 ENCOUNTER — OFFICE VISIT (OUTPATIENT)
Dept: NEUROLOGY | Facility: MEDICAL CENTER | Age: 86
End: 2021-11-24
Attending: PSYCHIATRY & NEUROLOGY
Payer: MEDICARE

## 2021-11-24 VITALS
HEART RATE: 92 BPM | OXYGEN SATURATION: 96 % | TEMPERATURE: 96.9 F | WEIGHT: 112.43 LBS | SYSTOLIC BLOOD PRESSURE: 122 MMHG | DIASTOLIC BLOOD PRESSURE: 68 MMHG | HEIGHT: 58 IN | BODY MASS INDEX: 23.6 KG/M2

## 2021-11-24 DIAGNOSIS — G30.1 LATE ONSET ALZHEIMER'S DEMENTIA WITHOUT BEHAVIORAL DISTURBANCE (HCC): Primary | ICD-10-CM

## 2021-11-24 DIAGNOSIS — F02.80 LATE ONSET ALZHEIMER'S DEMENTIA WITHOUT BEHAVIORAL DISTURBANCE (HCC): Primary | ICD-10-CM

## 2021-11-24 DIAGNOSIS — F03.B0: ICD-10-CM

## 2021-11-24 PROCEDURE — 99204 OFFICE O/P NEW MOD 45 MIN: CPT | Performed by: PSYCHIATRY & NEUROLOGY

## 2021-11-24 RX ORDER — ASPIRIN 81 MG/1
TABLET, CHEWABLE ORAL
COMMUNITY

## 2021-11-24 ASSESSMENT — PATIENT HEALTH QUESTIONNAIRE - PHQ9: CLINICAL INTERPRETATION OF PHQ2 SCORE: 0

## 2021-11-24 NOTE — PROGRESS NOTES
Reason for Neurology Consult:  Concern for Dementia    Here with Martita TREVIÑO    History of present illness:    Digna Patino Jian 87 y.o. right handed woman who is here with her daughter.  She is from [place] and then lived for many in the [facility] and moved to the [facility] in 2021. 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    She was formally diagnosed with Alz Type Dementia about 1 year ago in [facility].    She was given Exelon Patch (4.6 mg/24 hours) for a few which made her drowsy.    About 2-3 years ago daughter to recognize that Jose would repeat herself after say 30-60 minutes which progressed in frequency and severity to the point in the last 12 months> typically Jose will repeat herself or ask the same question over within 2-5 minutes of stating the information. Digna is aware of these gradually changes.    She has more difficulty locating certain buttons on her cell phone for over 1-2 years.     There has been slight decline Digna's functional and intellectual abilities in the last 2 years in terms of multi tasking.    Speech and language ability has not declined.    She has been clearly taking care of herself per daughter today.    No evolving gait decline or falls/near falls in the last 12 months.    No dysarthria,dysphagia,visual disturbances in the last 12 months.    No paranoia,delusions, visual hallucinations admitted to or noticed by daughter in the recent months.    No feelings of being depressed nor has the daughter noticed such concerning behaviors.    Denies ever being suicidal remotely or recently.    No history of concussion(s),stroke(s),seizure(s)    She has stopped driving.      Grandmother: late onset probable Alz type Dementia ( at age 86): onset in early to mid 70's    Prior evaluation (2020) reviewed:  Patient presented with daughter, Bing Mobley (who lives in [place]). She reports she became concerned regarding the patient's memory in . She states  she came to visit the patient and when the patient picked her up at the airport she was going to take a wrong exit and daughter had to guide her to get home. Her grandson also visited her in August and told Felisha that she gave him the wrong directions when he was driving her somewhere and seemed to be disoriented. Bing also reports patient got disoriented going to her doctor's office in December of 2019.     Patient's Narrative Report (Reason for this Evaluation): patient reports she is here because her daughter is concerned about her memory. She reports she was at the [facility] before and was told her tests results were normal (patient was evaluated in 2006. She states she may forget things but feels she can function well independently.     History Checklist (From Informant and Patient):   Symptom Onset and Course: (a) Duration: about a year ; (b) Course: gradual progression, no fluctuation of symptoms (c) was onset or progression associated with delirium? denies    Memory problems: daughter reports patient repeats questions or stories. She frequently misplaces personal possessions and household objects (e.g. keys, phone, etc. She does not forget familiar names of people, occasionally forgets names of familiar places. She frequently forgets to complete tasks. She forgets recent events. She writes all of her appointments in her calendar (has always done this and typically relies on her calendar to remember appointments). Denies forgeting familiar face. She has difficulty with sense of direction. Denies having difficulty remembering events/facts from many years ago.    Language difficulties: Speech is fluent, she has occasional difficulty with finding words. She is able to complete a sentence easily. She understands questions. She has difficulty comprehending instructions. Denies having difficulty with reading/writing.    Other cognitive symptoms: Has difficulty with orientation in space. Her daughter reports  she has no difficulty with orientation in time. Denies having difficulty with spatial judgment. She has some difficulty with calculations, making change and calculating tips. She has awareness of her memory loss but does not understand the severity of her cognitive deficits. Patient does not respond inappropriately to solicitations. She is not disinhibited.    Behavioral/Psychiatric Symptoms: daughter thinks patient is depressed, especially since the COVID pandemic given she is not able to go out as much. Patient denies feeling depressed Daughter reports she has always been anxious and head strong. Denies apathy. Denies hallucinations. She sleeps well, denies nightmares. Daughter reports she has always been impulsive and agitated and feels this has not gotten worse.     Complex (Instrumental) activities of daily living: patient manages her finances, her daughter Martita noticed she forgot to pay her bills so most of her bills are being set up for automatic payments. She cooks without problems. She manages her own medications however daughter is concerned that she may need more supervision with this task. Denies having difficulty using the TV remote. She does not use the computer. She continues to drive but a lot less, her driving is now more limited to the supermarket or very nearby places.    Basic Activities of daily living: denies having difficulty with dressing (including selecting clothes), grooming, showering, eating independently (using all utensils correctly)     Physical Function: denies having difficulty with walking or balance, denies any falls or syncopal episodes. Patient does not exercise. She has mild symptoms of bladder incontinence. Denies bowel incontinence. Appetite is preserved, no swallowing problems, no changes in weight. No recent hospitalizations    Living Circumstances: Lives alone in a condo. Patient has two daughter, Martita lives in [place] and Bing lives in [place]. 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 Her daughters are trying to arrange for her to move in January to [place] close to her daughter Martita, which patient initially agreed with but now seems to have doubts whethe ror not she wants to proceed with this plan.      Diagnostic tests ordered: brain MRI and labs  Medications ordered (for):  - Exelon patch 4.6 mg daily  Recommendations (driving, living situation, exercise, diet, etc.): discussed importance of regular physical activity, social interaction (while following COVID19 social distancing guidelines), heart healthy diet, and cognitive stimulation for memory preservation. Limit driving to only nearby and familiar places. Consider 's evaluation. Encouraged patient to move closer to one of her daughters, increase supervision especially with medications.  Follow-up appointment for: 4-6 weeks\"    Repeat evaluation in 11/2020:    NEUROCOGNITIVE EVALUATION: (11-)  AD8 /8   PSMS /24   FAQ /36   GDS /15     NEUROCOGNITIVE EVALUATION: (11- )  AD8 8/8   PSMS 0/24   FAQ 9/36   GDS /15     MMSE TOTAL FOLSTEIN SCORE (ALZ): 22/30   Orientation TOTAL ORIENTATION SCORE : 9/10   Brief Neurocognitive Score Brief Neurocognitive Score:: 22/60   Neurocognitive Score Neurocognitive Score: 27/90     Word Recall:   Recall 2(hielo, em,)/3   Recall 1 2(em, hielo)/3   Recall 2 2(em, mascara, hielo)/4   Recall 3 3(zapato, em, hielo, nell)/5   ADAS Naming:   Raw score 13/17   Total score 4/5   ADAS Commands 5/5   Clock Drawing 3/5   LASSI - II   Total 3/15   Delayed 2/15   Category Fluency:   Animals 5   Vegetables 7   Fruits 6   Words Total 18   Total Score 3/33     IMAGING AND LABORATORY DATA:  Results for orders placed or performed during the hospital encounter of 11/13/20   B 12 Level   Result Value Ref Range   Vitamin B12 244 193 - 986 PG/ML   C-Reactive Protein (CRP)   Result Value Ref Range   CRP 3.61 " "(H) 0.00 - 3.00 MG/L     MRI scan done on 11/13/2020 at [facility] shows the following findings: Neocortical atrophy: Mild moderate atrophy, most evident in the parietal/precuneus regions bilaterally. Ventricular Enlargement: Mild to moderate enlargement of lateral and third ventricles. Periventricular White Matter Hyperintensities: Minimal, diffuse. Deep White Matter Hyperintensities: None. Lacunar and Cortical Infarcts: Numerous punctate and mm size lacunes in BG and subInsular regions bilaterally with appearance of etat cribre. Hippocampal Atrophy: Mild on the left; Mild on the right. Entorhinal Cortex Atrophy: Minimal on the Left; Mild - moderate on the Right.    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     INITIAL DIAGNOSIS:  Cognitive Status: Mild Dementia (MMSE: 22/30 ) \"                       Patient Active Problem List    Diagnosis Date Noted   • 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) 10/07/2021   • History of recurrent UTIs 10/07/2021   • Other headache syndrome 10/07/2021       Past medical history:   Past Medical History:   Diagnosis Date   • CAD (coronary artery disease)    • Hypothyroid        Past surgical history:   Past Surgical History:   Procedure Laterality Date   • APPENDECTOMY     • CHOLECYSTECTOMY     • TONSILLECTOMY           Social history:   Social History     Socioeconomic History   • Marital status: Single     Spouse name: Not on file   • Number of children: Not on file   • Years of education: Not on file   • Highest education level: GED or equivalent   Occupational History   • Not on file   Tobacco Use   • Smoking status: Never Smoker   • Smokeless tobacco: Never Used   Vaping Use   • Vaping Use: Never used   Substance and Sexual Activity   • Alcohol use: Never   • " Drug use: Never   • Sexual activity: Not on file   Other Topics Concern   • Not on file   Social History Narrative   • Not on file     Social Determinants of Health     Financial Resource Strain: Medium Risk   • Difficulty of Paying Living Expenses: Somewhat hard   Food Insecurity: No Food Insecurity   • Worried About Running Out of Food in the Last Year: Never true   • Ran Out of Food in the Last Year: Never true   Transportation Needs: No Transportation Needs   • Lack of Transportation (Medical): No   • Lack of Transportation (Non-Medical): No   Physical Activity: Inactive   • Days of Exercise per Week: 0 days   • Minutes of Exercise per Session: 0 min   Stress: No Stress Concern Present   • Feeling of Stress : Not at all   Social Connections: Moderately Integrated   • Frequency of Communication with Friends and Family: More than three times a week   • Frequency of Social Gatherings with Friends and Family: Twice a week   • Attends [employer] Services: More than 4 times per year   • Active Member of Clubs or Organizations: No   • Attends Club or Organization Meetings: 1 to 4 times per year   • Marital Status:    Intimate Partner Violence:    • Fear of Current or Ex-Partner: Not on file   • Emotionally Abused: Not on file   • Physically Abused: Not on file   • Sexually Abused: Not on file   Housing Stability: Unknown   • Unable to Pay for Housing in the Last Year: Patient refused   • Number of Places Lived in the Last Year: 2   • Unstable Housing in the Last Year: No       Family history:   No family history on file.      Current medications:   Current Outpatient Medications   Medication   • Cholecalciferol 1.25 MG (05535 UT) Tab   • aspirin 81 MG EC tablet   • Icosapent Ethyl 1 g Cap   • ondansetron (ZOFRAN ODT) 4 MG TABLET DISPERSIBLE   • LEVOTHYROXINE SODIUM PO     No current facility-administered medications for this visit.       Medication Allergy:  Allergies   Allergen Reactions   • Memantine  "Hcl-Donepezil Hcl Anaphylaxis and Vomiting   • Penicillins Rash     RAsh           Physical examination:   Vitals:    11/24/21 1200   BP: 122/68   BP Location: Right arm   Patient Position: Sitting   BP Cuff Size: Adult   Pulse: 92   Temp: 36.1 °C (96.9 °F)   TempSrc: Temporal   SpO2: 96%   Weight: 51 kg (112 lb 7 oz)   Height: 1.473 m (4' 10\")       Normal cephalic atraumatic.  There is full range of movement around the neck in all directions without restrictions or discrete pain evoked triggers.  No lower extremity edema.      Neurological  Exam:      Meridian Cognitive Assessment (MOCA) Version 7.1    Years of Education: High School Education    TOTAL SCORE: 10 /30  (to be scanned into the MEDIA section in the E.M.R.)    Mental status: Awake, alert and fully oriented to person, place and situation. Normal attention and concentration.  Did not appear/act combative,irritable,anxious,paranoid/delusional or aggressive to or with me.    Speech and language: Speech is fluent without errors, clear, intact to repetition and intact to naming.     Follows 3 step motor commands in sequence without significant delay and correctly.    Cranial nerve exam:  II: Pupils are equally round and reactive to light. Visual fields are intact by confrontation.  III, IV, VI: EOMI, no diplopia, no ptosis.  V: Sensation to light touch is normal over V1-3 distributions bilaterally.  .  VII: Facial movements are symmetrical. There is no facial droop. .  VIII: Hearing intact to soft speech and finger rub bilaterally  IX: Palate elevates symmetrically, uvula is midline. Dysarthria is not present.  XI: Shoulder shrug are symmetrical and strong.   XII: Tongue protrudes midline.      Motor exam:  Muscle tone is normal in all 4 limbs. and No abnormal movements appreciated.    Muscle strength:    Neck Flexors/Extensors: 5/5       Right  Left  Deltoid   5/5  5/5      Biceps   5/5  5/5  Triceps  5/5  5/5   Wrist extensors 5/5  5/5  Wrist " flexors  5/5  5/5     5/5  5/5  Interossei  5/5  5/5  Thenar (APB)  5/5  5/5   Hip flexors  5/5  5/5  Quadriceps  5/5  5/5    Hamstrings  5/5  5/5  Dorsiflexors  5/5  5/5  Plantarflexors  5/5  5/5  Toe extension  5/5  5/5        Reflexes:       Right  Left  Biceps   /  2/4  Triceps  /  2/  Brachioradialis /  2/4  Knee jerk    2/  Ankle jerk  /  2/     Frontal release signs are absent    bilaterally toes are downgoing to plantar stimulation..    Coordination (finger-to-nose, heel/knee/shin, rapid alternating movements) was normal.     There was no ataxia, no tremors, and no dysmetria.     Station and gait were normal.     Easily stands up from exam chair without retropulsion,veering,leaning,swaying (to either side).       Labs and Tests:    Reviewed from 2020: B12: 244     NEUROIMAGIN2020: Brain MRI        Impression/Plans/Recommendations:    1.  Moderate Stage of  Dementia (probable Alzheimer's type).    Family history of this issue.    Onset of symptoms with primary amnestic predominance about 3 years with increasing forgetfulness suggestive of hippocampal failure.    Digna's short term memory is very poor with me today.    MoCA score today of 10/30.    Global Deterioration Score of 5 range and FAQ score of 20 per daughter today.        Today, I reviewed the clinical criteria and reviewed several  scenarios of the differences being using the medical terms of normal brain aging (age associated memory impairment),  Mild Cognitive Impairment (MCI) and Dementia.    Dementia  is a syndrome but statistically and for the majority of patients  occurs due  to a more rapid aging of the brain tissue or potentially from injury to certain parts of one's brain ( often from such contributing factors as  the cumulative effects of alcohol, from one or more ischemic or hemmorhagic stroke(s), from neurodegeneration or long standing with/without suboptimally controlled Hypertension). It is for some of these  "potential factors as to why I recommend a brain imaging test (Head CT or Brain MRI) be done for the 1st time or in certain circumstances repeated for comparison purposes  as such imaging can suggest one or more factors as to the reason(s) for the person's cognitive/memory changes. In fact, a normal or \"age related\" finding on a brain imaging test in and of itself is useful clinical and objective information to have in the evaluation of a person who has either an acute, evolving or even chronic (months to years) long cognitive/memory complaint.    Additional factors or contributors to Brain Health issues can be summarized in several books/references which I discussed as well today.     Goals going forwards include:    A. Paying attention to one's risk factors and reducing their prevalence or potential impact on one's changing memory/thinking> an excellent example would be to stop smoking, reduce or eliminate alcohol use (depending on the amount and frequency of usage), maintain good blood pressure control by buying a digital arm blood pressure cuff such as an OMRON Series 3 or 5 and checking one's blood pressure atleast 3 days per week (in the am and early afternoon) that the numbers are under 140/90 and working as needed with the primary care doctor  to optimize blood pressure control).    B. Encouraging proper sleep hygiene which for most adults is 7 to 8 hours of uninterrupted sleep per night.      C. Encouraging some form of exercise preferable aerobic forms to be done (4 to 5 days per week- 30 minutes minimum per day)> 150 minutes per week as a goal. Example activities could include fast walking (up a slight incline), jogging, cycling (road or stationary), swimming,tennis. Essentially, even basic walking on a flat surface or a treadmill would be better than doing nothing.    D. Maintaining or forming new social contacts with family and friends  and a positive attitude despite the concerns and/or ongoing issues " with thinking and/or memory.    E. Eating well which means a diet low in salt  (under 2 grams per day), sugar and saturated fat.    F. Maintaining one's BMI (Body Mass Index) under 30.    G. Consideration of the use of cognitive enhancers (acetylcholinerase inhibitors such as Aricept vs an NMDA Receptor agent like Namenda). Pros and cons of such compounds in terms of predicted efficacy and side effects profiles reviewed. She had used a low dose Exelon Patch (4.6 mg per 24 hours) which caused side effects (lethargy,drowsiness and/or did not feel right).    H. We reviewed general brain health strategies.      I. I briefly discussed that I  am keeping up with editorials and  comments from  many academic neurologists throughout the [place] who are writing reviews and summaries of the present state of this provisionally approved anti amyloid compound (aducanumab)    The FDA's Central and Peripheral Nervous System Advisory Committee voted 10-1 against the compound (the 1 person voted “uncertain”) in that the data did not confirm or support meaningful clinical benefit.      I have read that several senior research neurologists have even commented the compound did nothing clinically meaningful or useful and moreover there was no  clear evidence it prevented the clinical deterioration  of the patients' condition despite potentially removing some amyloid from their brain(s)  tissue.      Based on the critique of the data so far,  there was no  clear scientific evidence this anti amyloid intravenous compound reduced or halted the underlying disease or slowed down the inherent clinical deterioration expected with the Alzheimer's type of Dementia. The clinical data did also not suggest that activities of daily living were improved upon with this compound.    I have discussed with the patient and family today that given  the great controversy about the study's data and analysis of the lack of clinical  efficacy to this point in time,  I feel that I need to wait and see reasonable post marketing data and/or more robust efficacy data supported by the academic community and/or the American Academy of Neurology  before making a commitment to prescribe such a compound and  where there is more than  a minor/minimal amount of risk to the patient involved in being administered this compound on a chronic (monthly) basis.     J. Additional blood work to be done (Vitamin B levels) and Alz Connect Form to be filled out and sent.    I have performed  a history and physical exam and a directed /focused  ROS today.    Total time spent today or this patient's care was 50 minutes  and included reviewing  the diagnostic workup to date (such as labs and imaging as well as interpreting such tests relevant to this patient's neurological condition),  reviewing/obtaining separately obtained history (from patient and/or accompanying ffamily member)  for today's neurological problem(s) ,counseling and educating the patient and family member on issues related to cognition/memory and cognitive health factors and documenting  the clinical information in the EMR.    Follow up PRN.    To Trent MD  [facility].   [facility]

## 2021-12-03 LAB
ALBUMIN SERPL-MCNC: 4.2 G/DL (ref 3.6–4.6)
ALBUMIN/GLOB SERPL: 1.7 {RATIO} (ref 1.2–2.2)
ALP SERPL-CCNC: 90 IU/L (ref 44–121)
ALT SERPL-CCNC: 17 IU/L (ref 0–32)
AST SERPL-CCNC: 22 IU/L (ref 0–40)
BILIRUB SERPL-MCNC: 0.5 MG/DL (ref 0–1.2)
BUN SERPL-MCNC: 12 MG/DL (ref 8–27)
BUN/CREAT SERPL: 15 (ref 12–28)
CALCIUM SERPL-MCNC: 9.5 MG/DL (ref 8.7–10.3)
CHLORIDE SERPL-SCNC: 103 MMOL/L (ref 96–106)
CHOLEST SERPL-MCNC: 312 MG/DL (ref 100–199)
CO2 SERPL-SCNC: 24 MMOL/L (ref 20–29)
CREAT SERPL-MCNC: 0.82 MG/DL (ref 0.57–1)
ERYTHROCYTE [DISTWIDTH] IN BLOOD BY AUTOMATED COUNT: 13.7 % (ref 11.7–15.4)
GLOBULIN SER CALC-MCNC: 2.5 G/DL (ref 1.5–4.5)
GLUCOSE SERPL-MCNC: 99 MG/DL (ref 65–99)
HCT VFR BLD AUTO: 37.9 % (ref 34–46.6)
HDLC SERPL-MCNC: 57 MG/DL
HGB BLD-MCNC: 12.8 G/DL (ref 11.1–15.9)
LABORATORY COMMENT REPORT: ABNORMAL
LDLC SERPL CALC-MCNC: 211 MG/DL (ref 0–99)
MCH RBC QN AUTO: 30.5 PG (ref 26.6–33)
MCHC RBC AUTO-ENTMCNC: 33.8 G/DL (ref 31.5–35.7)
MCV RBC AUTO: 91 FL (ref 79–97)
NRBC BLD AUTO-RTO: NORMAL %
PLATELET # BLD AUTO: 307 X10E3/UL (ref 150–450)
POTASSIUM SERPL-SCNC: 4.2 MMOL/L (ref 3.5–5.2)
PROT SERPL-MCNC: 6.7 G/DL (ref 6–8.5)
RBC # BLD AUTO: 4.19 X10E6/UL (ref 3.77–5.28)
SODIUM SERPL-SCNC: 140 MMOL/L (ref 134–144)
T4 FREE SERPL-MCNC: 0.77 NG/DL (ref 0.82–1.77)
TRIGL SERPL-MCNC: 223 MG/DL (ref 0–149)
TSH SERPL DL<=0.005 MIU/L-ACNC: 17.1 UIU/ML (ref 0.45–4.5)
VLDLC SERPL CALC-MCNC: 44 MG/DL (ref 5–40)
WBC # BLD AUTO: 5.8 X10E3/UL (ref 3.4–10.8)

## 2021-12-08 ENCOUNTER — TELEPHONE (OUTPATIENT)
Dept: MEDICAL GROUP | Facility: PHYSICIAN GROUP | Age: 86
End: 2021-12-08

## 2021-12-08 ENCOUNTER — OFFICE VISIT (OUTPATIENT)
Dept: MEDICAL GROUP | Facility: PHYSICIAN GROUP | Age: 86
End: 2021-12-08
Payer: MEDICARE

## 2021-12-08 VITALS
OXYGEN SATURATION: 96 % | DIASTOLIC BLOOD PRESSURE: 66 MMHG | WEIGHT: 111 LBS | RESPIRATION RATE: 18 BRPM | TEMPERATURE: 97 F | BODY MASS INDEX: 23.3 KG/M2 | HEART RATE: 93 BPM | HEIGHT: 58 IN | SYSTOLIC BLOOD PRESSURE: 102 MMHG

## 2021-12-08 DIAGNOSIS — F02.80 LATE ONSET ALZHEIMER'S DEMENTIA WITHOUT BEHAVIORAL DISTURBANCE (HCC): ICD-10-CM

## 2021-12-08 DIAGNOSIS — E03.9 ACQUIRED HYPOTHYROIDISM: ICD-10-CM

## 2021-12-08 DIAGNOSIS — E78.49 OTHER HYPERLIPIDEMIA: ICD-10-CM

## 2021-12-08 DIAGNOSIS — G30.1 LATE ONSET ALZHEIMER'S DEMENTIA WITHOUT BEHAVIORAL DISTURBANCE (HCC): ICD-10-CM

## 2021-12-08 LAB
ALBUMIN SERPL-MCNC: 4.2 G/DL (ref 3.6–4.6)
ALBUMIN/GLOB SERPL: 1.8 {RATIO} (ref 1.2–2.2)
ALP SERPL-CCNC: 91 IU/L (ref 44–121)
ALT SERPL-CCNC: 14 IU/L (ref 0–32)
AST SERPL-CCNC: 20 IU/L (ref 0–40)
BASOPHILS # BLD AUTO: 0 X10E3/UL (ref 0–0.2)
BASOPHILS NFR BLD AUTO: 1 %
BILIRUB SERPL-MCNC: 0.5 MG/DL (ref 0–1.2)
BUN SERPL-MCNC: 11 MG/DL (ref 8–27)
BUN/CREAT SERPL: 14 (ref 12–28)
CALCIUM SERPL-MCNC: 9.4 MG/DL (ref 8.7–10.3)
CHLORIDE SERPL-SCNC: 102 MMOL/L (ref 96–106)
CO2 SERPL-SCNC: 24 MMOL/L (ref 20–29)
CREAT SERPL-MCNC: 0.77 MG/DL (ref 0.57–1)
EOSINOPHIL # BLD AUTO: 0.2 X10E3/UL (ref 0–0.4)
EOSINOPHIL NFR BLD AUTO: 4 %
ERYTHROCYTE [DISTWIDTH] IN BLOOD BY AUTOMATED COUNT: 13.8 % (ref 11.7–15.4)
FOLATE SERPL-MCNC: 19.1 NG/ML
GLOBULIN SER CALC-MCNC: 2.3 G/DL (ref 1.5–4.5)
GLUCOSE SERPL-MCNC: 100 MG/DL (ref 65–99)
HCT VFR BLD AUTO: 38.4 % (ref 34–46.6)
HGB BLD-MCNC: 12.8 G/DL (ref 11.1–15.9)
IMM GRANULOCYTES # BLD AUTO: 0 X10E3/UL (ref 0–0.1)
IMM GRANULOCYTES NFR BLD AUTO: 0 %
IMMATURE CELLS  115398: NORMAL
LYMPHOCYTES # BLD AUTO: 1.6 X10E3/UL (ref 0.7–3.1)
LYMPHOCYTES NFR BLD AUTO: 28 %
MCH RBC QN AUTO: 30.8 PG (ref 26.6–33)
MCHC RBC AUTO-ENTMCNC: 33.3 G/DL (ref 31.5–35.7)
MCV RBC AUTO: 92 FL (ref 79–97)
MONOCYTES # BLD AUTO: 0.3 X10E3/UL (ref 0.1–0.9)
MONOCYTES NFR BLD AUTO: 5 %
MORPHOLOGY BLD-IMP: NORMAL
NEUTROPHILS # BLD AUTO: 3.6 X10E3/UL (ref 1.4–7)
NEUTROPHILS NFR BLD AUTO: 62 %
NRBC BLD AUTO-RTO: NORMAL %
PLATELET # BLD AUTO: 298 X10E3/UL (ref 150–450)
POTASSIUM SERPL-SCNC: 4.4 MMOL/L (ref 3.5–5.2)
PROT SERPL-MCNC: 6.5 G/DL (ref 6–8.5)
RBC # BLD AUTO: 4.16 X10E6/UL (ref 3.77–5.28)
SODIUM SERPL-SCNC: 140 MMOL/L (ref 134–144)
TSH SERPL DL<=0.005 MIU/L-ACNC: 16.2 UIU/ML (ref 0.45–4.5)
VIT B1 BLD-SCNC: 96.4 NMOL/L (ref 66.5–200)
VIT B12 SERPL-MCNC: 255 PG/ML (ref 232–1245)
WBC # BLD AUTO: 5.7 X10E3/UL (ref 3.4–10.8)

## 2021-12-08 PROCEDURE — 99214 OFFICE O/P EST MOD 30 MIN: CPT | Performed by: FAMILY MEDICINE

## 2021-12-08 RX ORDER — LEVOTHYROXINE SODIUM 0.07 MG/1
75 TABLET ORAL
Qty: 90 TABLET | Refills: 3 | Status: SHIPPED | OUTPATIENT
Start: 2021-12-08

## 2021-12-08 RX ORDER — ROSUVASTATIN CALCIUM 20 MG/1
20 TABLET, COATED ORAL EVERY EVENING
Qty: 90 TABLET | Refills: 3 | Status: SHIPPED | OUTPATIENT
Start: 2021-12-08

## 2021-12-08 RX ORDER — ROSUVASTATIN CALCIUM 20 MG/1
20 TABLET, COATED ORAL EVERY EVENING
COMMUNITY
End: 2021-12-08 | Stop reason: SDUPTHER

## 2021-12-08 ASSESSMENT — FIBROSIS 4 INDEX: FIB4 SCORE: 1.56

## 2021-12-08 NOTE — ASSESSMENT & PLAN NOTE
Chronic issue.  The patient completed lipid panel with elevated LDL in the 200 range.  Not on statin.

## 2021-12-08 NOTE — ASSESSMENT & PLAN NOTE
Chronic issue.  The patient is supposed to be on levothyroxine though she admits to forgetting her medication.  TSH is uncontrolled with low T4.

## 2021-12-08 NOTE — PROGRESS NOTES
Subjective:     Chief Complaint   Patient presents with   • Follow-Up     Review Labs    • Other     Dizzy on and off all year; last week dizziness was more often   • Other     She takes lots of naps, and forgets medication        HPI:   Digna presents today to discuss the following.    Acquired hypothyroidism  Chronic issue.  The patient is supposed to be on levothyroxine though she admits to forgetting her medication.  TSH is uncontrolled with low T4.      Other hyperlipidemia  Chronic issue.  The patient completed lipid panel with elevated LDL in the 200 range.  Not on statin.    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)  Chronic issue  Saw neurology 11/26 for this   No medicine was recommended at this time  Comfort focused care may be recommended  Daughter would like  orders      Past Medical History:   Diagnosis Date   • CAD (coronary artery disease)    • Hypothyroid        Current Outpatient Medications Ordered in Epic   Medication Sig Dispense Refill   • rosuvastatin (CRESTOR) 20 MG Tab Take 20 mg by mouth every evening.     • Cholecalciferol 1.25 MG (02712 UT) Tab Take  by mouth.     • aspirin 81 MG EC tablet Take 81 mg by mouth every day. When she remember     • Icosapent Ethyl 1 g Cap Take 2 Caps by mouth.     • ondansetron (ZOFRAN ODT) 4 MG TABLET DISPERSIBLE Take 1 Tab by mouth every 8 hours as needed. 6 Tab 0   • LEVOTHYROXINE SODIUM PO Take  by mouth.     • aspirin (ASA) 81 MG Chew Tab chewable tablet Asprin Ec Low Dose 81 mg tablet,delayed release   Take 1 tablet every day by oral route.       No current [facility]-ordered facility-administered medications on file.       Allergies:  Memantine hcl-donepezil hcl, Penicillins, and Memantine    Health Maintenance: Completed    ROS:  Gen: no fevers/chills, no changes in weight  Eyes: no changes in vision  Pulm: no sob, no cough  CV: no chest pain, no palpitations  GI: no nausea/vomiting, no diarrhea      Objective:     Exam:  BP  "102/66 (BP Location: Left arm, Patient Position: Sitting, BP Cuff Size: Adult)   Pulse 93   Temp 36.1 °C (97 °F) (Temporal)   Resp 18   Ht 1.473 m (4' 10\")   Wt 50.3 kg (111 lb)   SpO2 96%   BMI 23.20 kg/m²  Body mass index is 23.2 kg/m².        Constitutional: Alert, no distress, well-groomed.  Skin: Warm, dry, good turgor, no rashes in visible areas.  Eye: Equal, round and reactive, conjunctiva clear, lids normal.  ENMT: Lips without lesions, good dentition, moist mucous membranes.  Neck: Trachea midline, no masses, no thyromegaly.  Respiratory: Unlabored respiratory effort, no cough.  MSK: Normal gait, moves all extremities.  Neuro: Grossly non-focal.   Psych: Alert and oriented x3, normal affect and mood.        Assessment & Plan:     87 y.o. female with the following -     1. Acquired hypothyroidism  Chronic, uncontrolled condition.  Widely uncontrolled TSH because the patient has not been taking her medication consistently.  I discussed ways to promote adherence.  Daughter is amenable to calling patient daily to remind her of medication.    2. Other hyperlipidemia  Chronic, uncontrolled condition.  Also because she is not taking statin therapy consistently.  As above.    3. 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)  Chronic, stable condition.  The patient is now following up with neurology as needed.  No medications indicated at this time.  Focused comfort care at this time.  The patient will take a trip to [facility] with some friends which should be stimulating and gratifying for her.      Return in about 6 months (around 6/8/2022).    Please note that this dictation was created using voice recognition software. I have made every reasonable attempt to correct obvious errors, but I expect that there are errors of grammar and possibly content that I did not discover before finalizing the note.      "

## 2021-12-09 NOTE — TELEPHONE ENCOUNTER
"After Visit with PCP patients Daughter is requesting 90 day supply for \"Synthroid 75MG\" Name brand not Levothyroxine and 90 day supply for Rosuvastatin 20MG    Please advise  "

## 2021-12-15 ENCOUNTER — TELEPHONE (OUTPATIENT)
Dept: NEUROLOGY | Facility: MEDICAL CENTER | Age: 86
End: 2021-12-15

## 2021-12-15 NOTE — TELEPHONE ENCOUNTER
Called pt to report 's message:    Digna     Please speak with Dr. Hoang Rowell (your primary care doctor) about your recent blood work results- your Thyroid test is not normal.     I also suggest you also take 1 milligram of Vitamin B12 every day.     You can buy Vitamin B12 tablets at any pharmacy and don't need a doctor's prescription.     To Anders answered, sent this through [employer] as Martita requested so she could reference it.

## 2021-12-20 ENCOUNTER — TELEPHONE (OUTPATIENT)
Dept: NEUROLOGY | Facility: MEDICAL CENTER | Age: 86
End: 2021-12-20

## 2022-11-09 ENCOUNTER — PATIENT MESSAGE (OUTPATIENT)
Dept: HEALTH INFORMATION MANAGEMENT | Facility: OTHER | Age: 87
End: 2022-11-09

## 2023-05-09 ENCOUNTER — TELEPHONE (OUTPATIENT)
Dept: HEALTH INFORMATION MANAGEMENT | Facility: OTHER | Age: 88
End: 2023-05-09

## 2023-09-12 ENCOUNTER — DOCUMENTATION (OUTPATIENT)
Dept: HEALTH INFORMATION MANAGEMENT | Facility: OTHER | Age: 88
End: 2023-09-12
Payer: MEDICARE

## 2023-11-29 ENCOUNTER — PATIENT MESSAGE (OUTPATIENT)
Dept: HEALTH INFORMATION MANAGEMENT | Facility: OTHER | Age: 88
End: 2023-11-29